# Patient Record
Sex: FEMALE | URBAN - METROPOLITAN AREA
[De-identification: names, ages, dates, MRNs, and addresses within clinical notes are randomized per-mention and may not be internally consistent; named-entity substitution may affect disease eponyms.]

---

## 2017-03-06 ENCOUNTER — TRANSFERRED RECORDS (OUTPATIENT)
Dept: HEALTH INFORMATION MANAGEMENT | Facility: CLINIC | Age: 47
End: 2017-03-06

## 2017-07-11 ENCOUNTER — TRANSFERRED RECORDS (OUTPATIENT)
Dept: HEALTH INFORMATION MANAGEMENT | Facility: CLINIC | Age: 47
End: 2017-07-11

## 2018-01-08 ENCOUNTER — TRANSFERRED RECORDS (OUTPATIENT)
Dept: HEALTH INFORMATION MANAGEMENT | Facility: CLINIC | Age: 48
End: 2018-01-08

## 2018-03-29 ENCOUNTER — TRANSFERRED RECORDS (OUTPATIENT)
Dept: HEALTH INFORMATION MANAGEMENT | Facility: CLINIC | Age: 48
End: 2018-03-29

## 2018-03-29 ENCOUNTER — MEDICAL CORRESPONDENCE (OUTPATIENT)
Dept: HEALTH INFORMATION MANAGEMENT | Facility: CLINIC | Age: 48
End: 2018-03-29

## 2018-05-01 ENCOUNTER — PRE VISIT (OUTPATIENT)
Dept: NEUROLOGY | Facility: CLINIC | Age: 48
End: 2018-05-01

## 2018-05-01 NOTE — TELEPHONE ENCOUNTER
FUTURE VISIT INFORMATION      FUTURE VISIT INFORMATION:    Date: 6/18/18    Time: 1:30p    [place] [facility] [place]  REFERRAL INFORMATION:    Referring provider: -Dr. Bogdan Arrington     Referring providers clinic:  [facility]    Reason for visit/diagnosis  Intractable chronic migraine without aura & without status migrainosus    RECORDS REQUESTED FROM:       Clinic name Comments Records Status Imaging Status   [facility] and referral Received                                    RECORDS STATUS        RECORDS RECEIVED FROM: [facility]   DATE RECEIVED: 4/27/18   NOTES (FOR ALL VISITS) STATUS DETAILS   OFFICE NOTE from referring provider Received 3/29/18, 10/12/17   OFFICE NOTE from other specialist N/A    DISCHARGE SUMMARY from hospital N/A    DISCHARGE REPORT from the ER N/A    OPERATIVE REPORT N/A    MEDICATION LIST Received    IMAGING  (FOR ALL VISITS)     EMG N/A    EEG N/A    ECT N/A    MRI (HEAD, NECK, SPINE) N/A    CT (HEAD, NECK, SPINE) N/A

## 2018-05-24 ENCOUNTER — TRANSFERRED RECORDS (OUTPATIENT)
Dept: HEALTH INFORMATION MANAGEMENT | Facility: CLINIC | Age: 48
End: 2018-05-24

## 2018-06-12 ASSESSMENT — ENCOUNTER SYMPTOMS
WHEEZING: 0
BACK PAIN: 1
NUMBNESS: 0
EYE WATERING: 0
HALLUCINATIONS: 0
DEPRESSION: 1
COUGH: 1
NECK PAIN: 1
SYNCOPE: 0
SNORES LOUDLY: 0
NAUSEA: 1
ALTERED TEMPERATURE REGULATION: 0
SLEEP DISTURBANCES DUE TO BREATHING: 0
NERVOUS/ANXIOUS: 0
SKIN CHANGES: 0
JOINT SWELLING: 0
DIZZINESS: 1
SPUTUM PRODUCTION: 0
SEIZURES: 0
POLYPHAGIA: 0
DYSURIA: 0
EYE PAIN: 1
VOMITING: 0
EXERCISE INTOLERANCE: 0
STIFFNESS: 1
TREMORS: 0
INSOMNIA: 1
COUGH DISTURBING SLEEP: 1
WEIGHT GAIN: 0
FATIGUE: 1
DOUBLE VISION: 1
DECREASED CONCENTRATION: 1
MUSCLE WEAKNESS: 0
DIFFICULTY URINATING: 0
PARALYSIS: 0
FLANK PAIN: 0
WEAKNESS: 0
HEADACHES: 1
HEMATURIA: 0
HYPOTENSION: 0
LIGHT-HEADEDNESS: 1
POOR WOUND HEALING: 0
POLYDIPSIA: 0
BLOATING: 0
BOWEL INCONTINENCE: 0
PANIC: 0
ORTHOPNEA: 0
CHILLS: 1
SORE THROAT: 0
INCREASED ENERGY: 0
LOSS OF CONSCIOUSNESS: 0
WEIGHT LOSS: 1
SHORTNESS OF BREATH: 0
ARTHRALGIAS: 1
HEARTBURN: 0
TROUBLE SWALLOWING: 0
FEVER: 0
SINUS CONGESTION: 0
NECK MASS: 0
POSTURAL DYSPNEA: 0
MEMORY LOSS: 1
EYE REDNESS: 0
DISTURBANCES IN COORDINATION: 0
SMELL DISTURBANCE: 0
EYE IRRITATION: 0
HOARSE VOICE: 0
SINUS PAIN: 0
TINGLING: 0
JAUNDICE: 0
DIARRHEA: 1
TASTE DISTURBANCE: 0
RECTAL PAIN: 0
CONSTIPATION: 1
HEMOPTYSIS: 0
NIGHT SWEATS: 1
DYSPNEA ON EXERTION: 0
MUSCLE CRAMPS: 0
ABDOMINAL PAIN: 1
DECREASED APPETITE: 1
HYPERTENSION: 0
LEG PAIN: 0
BLOOD IN STOOL: 0
NAIL CHANGES: 0
SPEECH CHANGE: 0
MYALGIAS: 1
PALPITATIONS: 0

## 2018-06-18 ENCOUNTER — HEALTH MAINTENANCE LETTER (OUTPATIENT)
Age: 48
End: 2018-06-18

## 2018-06-18 ENCOUNTER — OFFICE VISIT (OUTPATIENT)
Dept: NEUROLOGY | Facility: CLINIC | Age: 48
End: 2018-06-18
Payer: MEDICARE

## 2018-06-18 ENCOUNTER — MEDICAL CORRESPONDENCE (OUTPATIENT)
Dept: HEALTH INFORMATION MANAGEMENT | Facility: CLINIC | Age: 48
End: 2018-06-18

## 2018-06-18 VITALS
WEIGHT: 143.3 LBS | BODY MASS INDEX: 22.49 KG/M2 | OXYGEN SATURATION: 98 % | HEART RATE: 82 BPM | SYSTOLIC BLOOD PRESSURE: 122 MMHG | HEIGHT: 67 IN | TEMPERATURE: 96.7 F | RESPIRATION RATE: 15 BRPM | DIASTOLIC BLOOD PRESSURE: 40 MMHG

## 2018-06-18 DIAGNOSIS — G43.711 INTRACTABLE CHRONIC MIGRAINE WITHOUT AURA AND WITH STATUS MIGRAINOSUS: Primary | ICD-10-CM

## 2018-06-18 PROBLEM — F32.A DEPRESSION: Status: ACTIVE | Noted: 2018-06-18

## 2018-06-18 PROBLEM — Z87.898 PERSONAL HISTORY OF OTHER SPECIFIED DISEASES(V13.89): Status: ACTIVE | Noted: 2018-06-18

## 2018-06-18 PROBLEM — J45.909 ASTHMA: Status: ACTIVE | Noted: 2018-06-18

## 2018-06-18 PROBLEM — F32.A DEPRESSIVE DISORDER: Status: ACTIVE | Noted: 2018-06-18

## 2018-06-18 PROBLEM — M46.92: Status: ACTIVE | Noted: 2018-06-18

## 2018-06-18 RX ORDER — DIHYDROERGOTAMINE MESYLATE 1 MG/ML
1 INJECTION, SOLUTION INTRAMUSCULAR; INTRAVENOUS; SUBCUTANEOUS PRN
COMMUNITY
End: 2020-01-06

## 2018-06-18 RX ORDER — IBUPROFEN 200 MG
TABLET ORAL PRN
COMMUNITY

## 2018-06-18 RX ORDER — PEPPERMINT OIL
OIL (ML) MISCELLANEOUS PRN
COMMUNITY
End: 2018-06-18

## 2018-06-18 RX ORDER — TRIAMCINOLONE ACETONIDE 55 UG/1
2 SPRAY, METERED NASAL DAILY
COMMUNITY

## 2018-06-18 RX ORDER — SALICYLIC ACID
POWDER (GRAM) MISCELLANEOUS PRN
COMMUNITY
End: 2018-06-18

## 2018-06-18 RX ORDER — BACLOFEN 10 MG/1
15 TABLET ORAL 3 TIMES DAILY
COMMUNITY
Start: 2018-04-19

## 2018-06-18 RX ORDER — BUPROPION HYDROCHLORIDE 300 MG/1
300 TABLET ORAL DAILY
COMMUNITY
Start: 2018-04-19

## 2018-06-18 RX ORDER — ACETAMINOPHEN 500 MG
500 TABLET ORAL PRN
COMMUNITY

## 2018-06-18 RX ORDER — MECLIZINE HYDROCHLORIDE 25 MG/1
25 TABLET ORAL PRN
COMMUNITY
Start: 2017-10-12

## 2018-06-18 RX ORDER — KETOROLAC TROMETHAMINE 30 MG/ML
INJECTION, SOLUTION INTRAMUSCULAR; INTRAVENOUS PRN
COMMUNITY
Start: 2017-10-12

## 2018-06-18 RX ORDER — ONDANSETRON 8 MG/1
TABLET, FILM COATED ORAL PRN
COMMUNITY

## 2018-06-18 RX ORDER — MONTELUKAST SODIUM 10 MG/1
10 TABLET ORAL DAILY
COMMUNITY
Start: 2017-10-12

## 2018-06-18 RX ORDER — ALBUTEROL SULFATE 90 UG/1
2 AEROSOL, METERED RESPIRATORY (INHALATION) PRN
COMMUNITY
Start: 2017-10-12

## 2018-06-18 RX ORDER — ATENOLOL 25 MG/1
25 TABLET ORAL DAILY
COMMUNITY
Start: 2018-04-19 | End: 2020-01-06

## 2018-06-18 RX ORDER — ONDANSETRON 4 MG/1
TABLET, FILM COATED ORAL PRN
COMMUNITY
Start: 2017-10-12 | End: 2020-01-06

## 2018-06-18 RX ORDER — ATENOLOL 25 MG/1
25 TABLET ORAL DAILY
COMMUNITY
End: 2018-06-18

## 2018-06-18 ASSESSMENT — PAIN SCALES - GENERAL: PAINLEVEL: MODERATE PAIN (5)

## 2018-06-18 NOTE — NURSING NOTE
Chief Complaint   Patient presents with     Consult     [facility]- NEW CHRONIC MIGRAINES     Cachorro Gamez, EMT

## 2018-06-18 NOTE — LETTER
6/18/2018       RE: Yady Aquino  [address]  [facility] [address]     Dear Colleague,    Thank you for referring your patient, Yady Aquino, to the [facility] NEUROLOGY at [facility]. Please see a copy of my visit note below.        [facility]    Yady Aquino MRN# [id number]   Age: 47 year old YOB: 1970     Requesting physician: Bogdan Gary     Chief Complaint:    Referred by Dr. Rainey for evaluation of chronic migraine headaches    History of Present Illness:  Yady  is a 47-year-old woman who is from [place].  She is accompanied to her appointment by her .  The patient reports she has had headaches constantly since December 2014. She reports she had had headaches since childhood. She had them 2-3 times a week and then it changed to an intractable headache.    She also has chronic neck pain.  She is on disability. Has been receiving care in [place].    She has variable locations of the pain.   Mostly throbbing or pulsatile  4-5/10 on average, flares to 8/10  No clear triggers for flares but later she brings out a list of triggers including stress, riding in a car, pressure on head from pillow, reading for more than 5-10. She feels worse with hair tied back, physical activity, neck and back pain.    She has light and sound sensitivity intermittent  She has nausea, no vomiting    Tried the following preventive treatments:  Verapamil 180 mg caused constipation, no help  Botox 4 treatments Q 12 weeks  Topamax 25 mg BID blurred vision, stomach upset  Keppra 250 mg 1-2 tabs BID  Effexor dose ?  Lamictal dose ? Rash  Gabapentin dose ?  Hydroxyzine 50 mg Q AM, 50 mg noon and 100 mg Q HS  Nortriptyline 25 mg didn't work, dry mouth, more depressed       Abortives tried and failed:  Ibuprofen  Tylenol  Maxalt  Migranal  Zomig  Axert  Midrin  Phenergan      Currently she is taking is bupropion for mood. She is  baclofen for neck pain.   She uses ibuprofen or tylenol 1-2 times a week  DHE no more than twice a week.    She has tried numerous DHE infusions which seems to have been the best thing for her.  She gets 2 days headache freedom and then the symptoms persist.  The patient denies any symptoms of depression.    She has gone physical therapy.   No acupuncture, has tried dry needling  Chiropractor.    Multiple MRI brain in past. Disc from [place]    She has hemochromatosis. 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 No pertinent surgical history. No surgeries except radiofrequency ablation    Social History     Social History     Marital status: Single     Spouse name: N/A     Number of children: N/A     Years of education: N/A     Occupational History     Not on file.     Social History Main Topics     Smoking status: Never Smoker     Smokeless tobacco: Never Used     Alcohol use Yes      Comment: LESS THAN ONE PER WEEK     Drug use: No     Sexual activity: Yes     Partners: Male     Birth control/ protection: Male Surgical     Other Topics Concern     Not on file     Social History Narrative     No narrative on file   Caffeine: one can/once a week  Exercising: no    Family History   Problem Relation Age of Onset      Hemochromatosis Sister      Migraines Sister      3 sisters     Hemochromatosis Brother          Current Outpatient Prescriptions   Medication Sig     acetaminophen (TYLENOL) 500 MG tablet Take 500 mg by mouth as needed     albuterol (PROAIR HFA/PROVENTIL HFA/VENTOLIN HFA) 108 (90 Base) MCG/ACT Inhaler Inhale 2 puffs into the lungs as needed     atenolol (TENORMIN) 25 MG tablet Take 25 mg by mouth daily     atenolol (TENORMIN) 25 MG tablet Take 25 mg by mouth daily     baclofen (LIORESAL) 10 MG tablet Take 15 mg by mouth 3 times daily     buPROPion (WELLBUTRIN XL) 300 MG 24 hr tablet Take 300 mg by mouth daily     Calcium Carb-Cholecalciferol (CALCIUM + D3) 600-800 MG-UNIT TABS Take 600-800 mg by mouth daily     dihydroergotamine (DHE) 1 MG/ML injection Inject 1 mg into the muscle as needed     DiphenhydrAMINE HCl, Sleep, 25 MG CAPS Take 25 mg by mouth as needed     FOLIC ACID PO Take 800 mcg by mouth daily     ibuprofen (ADVIL/MOTRIN) 200 MG tablet as needed     ketorolac (TORADOL) 60 MG/2ML SOLN injection as needed     Ketorolac Tromethamine (TORADOL IM)      Lavender Oil OIL as needed     MAGNESIUM CITRATE  mg daily     meclizine (ANTIVERT) 25 MG tablet 25 mg as needed     montelukast (SINGULAIR) 10 MG tablet 10 mg daily     Multiple Vitamins-Minerals (MULTIVITAL) TABS 1 tablet daily     ondansetron (ZOFRAN) 4 MG tablet as needed     ondansetron (ZOFRAN) 8 MG tablet as needed     peppermint oil oil as needed for other     Probiotic Product (SOLUBLE FIBER/PROBIOTICS PO)      ranitidine (ZANTAC) 150 MG tablet 150 mg as needed     triamcinolone (NASACORT ALLERGY 24HR) 55 MCG/ACT Inhaler daily     No current facility-administered medications for this visit.           Allergies   Allergen Reactions     Lamictal [Lamotrigine] Shortness Of Breath     rash     Metronidazole GI Disturbance     Metronidazole GI Disturbance     Seasonal Other (See Comments)       ROS: Please see HPI all other systems review and  "negative. Sleep 7-8 hours a night. Usually interrupted. She awakens with the headache pain.     Physical Examination:  /40  Pulse 82  Temp 96.7  F (35.9  C) (Oral)  Resp 15  Ht 1.707 m (5' 7.2\")  Wt 65 kg (143 lb 4.8 oz)  SpO2 98%  Breastfeeding? No  BMI 22.31 kg/m2  General Appearance:  The patient is well-groomed and cooperative with examination.  She is in no acute distress.  Somewhat of a flat affect  Neurological Examination  Cognition: oriented x3, attention and recall intact. No aphasia or dysarthria  Cranial Nerves: 2-12 intact.  Patient has normal muscle bulk and tone in all 4 extremities.  Gait examination is stable    Cardiovascular Examination:  Heart is regular in rate and rhythm to auscultation. No significant murmurs. No carotid bruits. No significant peripheral edema. Pedal pulses are palpable bilaterally.     Musculoskeletal Examination:  Neck is supple with full range of motion. No tenderness to palpation.      Investigations:  MRI scan of the brain reviewed from March 2015 [facility] at [facility].  Images are entirely normal    Impression/Recommendations:  1.  Chronic migraine headache, intractable  The patient reports constant headache since December 2014.  She has lifelong problems with migraine headaches.  She has failed numerous medications as listed in HPI.  I reviewed with her that there are very few options left for her.  Most likely her headache management is about managing and living with pain rather than becoming pain-free.    The patient needs to understand the medication overuse can occur if she uses too many different short acting pain medicines.  Currently I believe she is using ibuprofen 2 days a week, Toradol 2 days a week and DHE 2 days a week.  She needs to limit use to 2 days a week and pick the most effective treatment.  She needs to start exercising again and working on healthy lifestyle choices for people with migraine headaches.    I explained to the " patient that the key to treating intractable headaches is not to focus on the abortive treatments but rather on the preventive treatments.  She could consider trying the new medication, Aimovig, at the higher dose of 140 mg once a month.  She is concerned about the cost but overall the treatment cost is less than Botox.    The patient was also educated regarding the different medical devices around to treat migraine headaches including the Cefaly device and eNeura.  We discussed how they are used, effectiveness as well as cost.    The patient would like to try Aimovig first I had her fill out an application form and counseled her with the demo pen exactly how the injections are done.  Because she comes from so far away she will use [employer] support services through to help with the injection treatment rather than coming back here. Aimovig Ally we will set up a telephone call visit in 8 weeks time to see how her headaches have improved.    Today I met with the patient for 70 minutes over 50% of this was counseling regarding chronic migraine headache management.    Kourtney Willis MD [facility]  Department of Neurology

## 2018-06-18 NOTE — PATIENT INSTRUCTIONS
Keep headache journal of your symptoms. Try to track how often you have a headache, how long it lasts and what medicines you used. You can also track severity of headache    You can use smart phone apps: my migraine luis felipe or use paper.       Try not to use triptan medicines more than 2 days a week    Lifestyle changes you can make to help your headaches:  1. Try to drink enough water each day (48-70 fluid ounces a day)  2. Limit caffeine intact-one caffeinated beverage a day  3. Eat regular meals  4. Try to get cardiovascular exercise (walking, swimming, biking) 4-5 times a week  5. Try to have a routine sleep schedule going to bed at the same time each night and getting up the same time each morning with enough time to sleep in between  6. Sometimes foods can trigger migraines you can try to eliminate them if you think they make symptoms worse: dairy, gluten, nuts (cashews, almonds), artificial sweeteners, artificial colors, high sugar content foods, certain alcohol, chocolate.    To learn more about headaches you can go to the following websites:  https://americanmigrainefoundation.org/  http://www.headaches.org/

## 2018-06-18 NOTE — PROGRESS NOTES
[facility]    Yady Aquino MRN# [id number]   Age: 47 year old YOB: 1970     Requesting physician: Bogdan Gary     Chief Complaint:    Referred by Dr. Rainey for evaluation of chronic migraine headaches    History of Present Illness:  Yady  is a 47-year-old woman who is from [place].  She is accompanied to her appointment by her .  The patient reports she has had headaches constantly since December 2014. She reports she had had headaches since childhood. She had them 2-3 times a week and then it changed to an intractable headache.    She also has chronic neck pain.  She is on disability. Has been receiving care in [place].    She has variable locations of the pain.   Mostly throbbing or pulsatile  4-5/10 on average, flares to 8/10  No clear triggers for flares but later she brings out a list of triggers including stress, riding in a car, pressure on head from pillow, reading for more than 5-10. She feels worse with hair tied back, physical activity, neck and back pain.    She has light and sound sensitivity intermittent  She has nausea, no vomiting    Tried the following preventive treatments:  Verapamil 180 mg caused constipation, no help  Botox 4 treatments Q 12 weeks  Topamax 25 mg BID blurred vision, stomach upset  Keppra 250 mg 1-2 tabs BID  Effexor dose ?  Lamictal dose ? Rash  Gabapentin dose ?  Hydroxyzine 50 mg Q AM, 50 mg noon and 100 mg Q HS  Nortriptyline 25 mg didn't work, dry mouth, more depressed       Abortives tried and failed:  Ibuprofen  Tylenol  Maxalt  Migranal  Zomig  Axert  Midrin  Phenergan      Currently she is taking is bupropion for mood. She is baclofen for neck pain.   She uses ibuprofen or tylenol 1-2 times a week  DHE no more than twice a week.    She has tried numerous DHE infusions which seems to have been the best thing for her.  She gets 2 days headache freedom and then the symptoms persist.  The patient denies any  symptoms of depression.    She has gone physical therapy.   No acupuncture, has tried dry needling  Chiropractor.    Multiple MRI brain in past. Disc from [place]    She has hemochromatosis. 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 No pertinent surgical history. No surgeries except radiofrequency ablation    Social History     Social History     Marital status: Single     Spouse name: N/A     Number of children: N/A     Years of education: N/A     Occupational History     Not on file.     Social History Main Topics     Smoking status: Never Smoker     Smokeless tobacco: Never Used     Alcohol use Yes      Comment: LESS THAN ONE PER WEEK     Drug use: No     Sexual activity: Yes     Partners: Male     Birth control/ protection: Male Surgical     Other Topics Concern     Not on file     Social History Narrative     No narrative on file   Caffeine: one can/once a week  Exercising: no    Family History   Problem Relation Age of Onset     Hemochromatosis Sister      Migraines Sister      3 sisters     Hemochromatosis Brother          Current Outpatient Prescriptions   Medication Sig     acetaminophen (TYLENOL) 500 MG tablet Take 500 mg by mouth as needed     albuterol (PROAIR HFA/PROVENTIL HFA/VENTOLIN HFA) 108 (90 Base)  "MCG/ACT Inhaler Inhale 2 puffs into the lungs as needed     atenolol (TENORMIN) 25 MG tablet Take 25 mg by mouth daily     atenolol (TENORMIN) 25 MG tablet Take 25 mg by mouth daily     baclofen (LIORESAL) 10 MG tablet Take 15 mg by mouth 3 times daily     buPROPion (WELLBUTRIN XL) 300 MG 24 hr tablet Take 300 mg by mouth daily     Calcium Carb-Cholecalciferol (CALCIUM + D3) 600-800 MG-UNIT TABS Take 600-800 mg by mouth daily     dihydroergotamine (DHE) 1 MG/ML injection Inject 1 mg into the muscle as needed     DiphenhydrAMINE HCl, Sleep, 25 MG CAPS Take 25 mg by mouth as needed     FOLIC ACID PO Take 800 mcg by mouth daily     ibuprofen (ADVIL/MOTRIN) 200 MG tablet as needed     ketorolac (TORADOL) 60 MG/2ML SOLN injection as needed     Ketorolac Tromethamine (TORADOL IM)      Lavender Oil OIL as needed     MAGNESIUM CITRATE  mg daily     meclizine (ANTIVERT) 25 MG tablet 25 mg as needed     montelukast (SINGULAIR) 10 MG tablet 10 mg daily     Multiple Vitamins-Minerals (MULTIVITAL) TABS 1 tablet daily     ondansetron (ZOFRAN) 4 MG tablet as needed     ondansetron (ZOFRAN) 8 MG tablet as needed     peppermint oil oil as needed for other     Probiotic Product (SOLUBLE FIBER/PROBIOTICS PO)      ranitidine (ZANTAC) 150 MG tablet 150 mg as needed     triamcinolone (NASACORT ALLERGY 24HR) 55 MCG/ACT Inhaler daily     No current facility-administered medications for this visit.           Allergies   Allergen Reactions     Lamictal [Lamotrigine] Shortness Of Breath     rash     Metronidazole GI Disturbance     Metronidazole GI Disturbance     Seasonal Other (See Comments)       ROS: Please see HPI all other systems review and negative. Sleep 7-8 hours a night. Usually interrupted. She awakens with the headache pain.     Physical Examination:  /40  Pulse 82  Temp 96.7  F (35.9  C) (Oral)  Resp 15  Ht 1.707 m (5' 7.2\")  Wt 65 kg (143 lb 4.8 oz)  SpO2 98%  Breastfeeding? No  BMI 22.31 kg/m2  General " Appearance:  The patient is well-groomed and cooperative with examination.  She is in no acute distress.  Somewhat of a flat affect  Neurological Examination  Cognition: oriented x3, attention and recall intact. No aphasia or dysarthria  Cranial Nerves: 2-12 intact.  Patient has normal muscle bulk and tone in all 4 extremities.  Gait examination is stable    Cardiovascular Examination:  Heart is regular in rate and rhythm to auscultation. No significant murmurs. No carotid bruits. No significant peripheral edema. Pedal pulses are palpable bilaterally.     Musculoskeletal Examination:  Neck is supple with full range of motion. No tenderness to palpation.      Investigations:  MRI scan of the brain reviewed from March 2015 performed at [facility].  Images are entirely normal    Impression/Recommendations:  1.  Chronic migraine headache, intractable  The patient reports constant headache since December 2014.  She has lifelong problems with migraine headaches.  She has failed numerous medications as listed in HPI.  I reviewed with her that there are very few options left for her.  Most likely her headache management is about managing and living with pain rather than becoming pain-free.    The patient needs to understand the medication overuse can occur if she uses too many different short acting pain medicines.  Currently I believe she is using ibuprofen 2 days a week, Toradol 2 days a week and DHE 2 days a week.  She needs to limit use to 2 days a week and pick the most effective treatment.  She needs to start exercising again and working on healthy lifestyle choices for people with migraine headaches.    I explained to the patient that the key to treating intractable headaches is not to focus on the abortive treatments but rather on the preventive treatments.  She could consider trying the new medication, Aimovig, at the higher dose of 140 mg once a month.  She is concerned about the cost but overall the treatment  cost is less than Botox.    The patient was also educated regarding the different medical devices around to treat migraine headaches including the Cefaly device and eNeura.  We discussed how they are used, effectiveness as well as cost.    The patient would like to try Aimovig first I had her fill out an application form and counseled her with the demo pen exactly how the injections are done.  Because she comes from so far away she will use [employer] support services through to help with the injection treatment rather than coming back here. Aimovig Ally we will set up a telephone call visit in 8 weeks time to see how her headaches have improved.    Today I met with the patient for 70 minutes over 50% of this was counseling regarding chronic migraine headache management.    Kourtney Willis MD [facility]  Department of Neurology      Answers for HPI/ROS submitted by the patient on 6/12/2018   General Symptoms: Yes  Skin Symptoms: Yes  HENT Symptoms: Yes  EYE SYMPTOMS: Yes  HEART SYMPTOMS: Yes  LUNG SYMPTOMS: Yes  INTESTINAL SYMPTOMS: Yes  URINARY SYMPTOMS: Yes  GYNECOLOGIC SYMPTOMS: No  BREAST SYMPTOMS: No  SKELETAL SYMPTOMS: Yes  BLOOD SYMPTOMS: No  NERVOUS SYSTEM SYMPTOMS: Yes  MENTAL HEALTH SYMPTOMS: Yes  Fever: No  Loss of appetite: Yes  Weight loss: Yes  Weight gain: No  Fatigue: Yes  Night sweats: Yes  Chills: Yes  Increased stress: Yes  Excessive hunger: No  Excessive thirst: No  Feeling hot or cold when others believe the temperature is normal: No  Loss of height: No  Post-operative complications: No  Surgical site pain: No  Hallucinations: No  Change in or Loss of Energy: No  Hyperactivity: No  Confusion: No  Changes in hair: Yes  Changes in moles/birth marks: No  Itching: Yes  Rashes: Yes  Changes in nails: No  Acne: No  Hair in places you don't want it: No  Change in facial hair: No  Warts: No  Non-healing sores: No  Scarring: No  Flaking of skin: No  Color changes of hands/feet in cold : No  Sun sensitivity:  No  Skin thickening: No  Ear pain: Yes  Ear discharge: No  Hearing loss: No  Tinnitus: Yes  Nosebleeds: No  Congestion: No  Sinus pain: No  Trouble swallowing: No   Voice hoarseness: No  Mouth sores: No  Sore throat: No  Tooth pain: No  Gum tenderness: No  Bleeding gums: No  Change in taste: No  Change in sense of smell: No  Dry mouth: No  Hearing aid used: No  Neck lump: No  Eye pain: Yes  Vision loss: Yes  Dry eyes: No  Watery eyes: No  Eye bulging: No  Double vision: Yes  Flashing of lights: No  Spots: No  Floaters: No  Redness: No  Crossed eyes: No  Tunnel Vision: No  Yellowing of eyes: No  Eye irritation: No  Cough: Yes  Sputum or phlegm: No  Coughing up blood: No  Difficulty breating or shortness of breath: No  Snoring: No  Wheezing: No  Difficulty breathing on exertion: No  Nighttime Cough: Yes  Difficulty breathing when lying flat: No  Chest pain or pressure: No  Fast or irregular heartbeat: No  Pain in legs with walking: No  Trouble breathing while lying down: No  Fingers or toes appear blue: No  High blood pressure: No  Low blood pressure: No  Fainting: No  Murmurs: No  Pacemaker: No  Varicose veins: No  Edema or swelling: No  Wake up at night with shortness of breath: No  Light-headedness: Yes  Exercise intolerance: No  Heart burn or indigestion: No  Nausea: Yes  Vomiting: No  Abdominal pain: Yes  Bloating: No  Constipation: Yes  Diarrhea: Yes  Blood in stool: No  Black stools: No  Rectal or Anal pain: No  Fecal incontinence: No  Yellowing of skin or eyes: No  Vomit with blood: No  Change in stools: No  Trouble holding urine or incontinence: Yes  Pain or burning: No  Trouble starting or stopping: No  Increased frequency of urination: No  Blood in urine: No  Decreased frequency of urination: No  Frequent nighttime urination: No  Flank pain: No  Difficulty emptying bladder: No  Back pain: Yes  Muscle aches: Yes  Neck pain: Yes  Swollen joints: No  Joint pain: Yes  Muscle cramps: No  Muscle weakness:  No  Joint stiffness: Yes  Bone fracture: No  Trouble with coordination: No  Dizziness or trouble with balance: Yes  Fainting or black-out spells: No  Memory loss: Yes  Headache: Yes  Seizures: No  Speech problems: No  Tingling: No  Tremor: No  Weakness: No  Difficulty walking: No  Paralysis: No  Numbness: No  Nervous or Anxious: No  Depression: Yes  Trouble sleeping: Yes  Trouble thinking or concentrating: Yes  Mood changes: No  Panic attacks: No

## 2018-08-13 ENCOUNTER — TELEPHONE (OUTPATIENT)
Dept: NEUROLOGY | Facility: CLINIC | Age: 48
End: 2018-08-13

## 2018-08-13 NOTE — TELEPHONE ENCOUNTER
[facility] Prior Authorization Team Request    Medication: AIMOVIG 140MG (2 X 70 AUTO-INJECTIORS)  Dosin SYRINGES ONCE EVERY 30 DAYS  Qty: 2  Day Supply: 30  NDC (required for Medicaid members): [address]-90     Insurance   BIN: [id number]  PCN: [id number]  Grp: [id number]  ID: [id number]    CoverMyMeds Key (if applicable):     Additional documentation:       Filling Pharmacy: [facility]  Phone Number: [phone]  Contact:    Pharmacy NPI (required for Medicaid members): [id number]

## 2018-08-13 NOTE — TELEPHONE ENCOUNTER
[facility] Prior Authorization Team   Phone: [phone]  Fax: [phone]  PA Initiation    Medication: AIMOVIG 140MG (2 X 70 AUTO-INJECTIORS)-PA PENDING  Insurance Company: [employer] - Phone [phone] Fax [phone]  Pharmacy Filling the Rx: [facility] [place], [place] - [address] [place] [address] [phone]  Filling Pharmacy Phone: [phone]  Filling Pharmacy Fax: [phone]  Start Date: 8/13/2018

## 2018-08-14 NOTE — TELEPHONE ENCOUNTER
[facility] Prior Authorization Team   Phone: [phone]  Fax: [phone]  Prior Authorization Approval    Authorization Effective Date: 12/30/2017  Authorization Expiration Date: 12/31/2018  Medication: AIMOVIG 140MG (2 X 70 AUTO-INJECTIORS)-PA APPROVED  Approved Dose/Quantity: 2/30DS  Reference #:    Insurance Company: [employer] - Phone [phone] Fax [phone]  Expected CoPay:       CoPay Card Available:      Foundation Assistance Needed:    Which Pharmacy is filling the prescription (Not needed for infusion/clinic administered): [facility] [place], [place] - [address] [place] [address] [phone]  Pharmacy Notified: Yes  Patient Notified: Yes

## 2018-10-30 DIAGNOSIS — G43.711 INTRACTABLE CHRONIC MIGRAINE WITHOUT AURA AND WITH STATUS MIGRAINOSUS: ICD-10-CM

## 2018-12-18 ENCOUNTER — MYC REFILL (OUTPATIENT)
Dept: NEUROLOGY | Facility: CLINIC | Age: 48
End: 2018-12-18

## 2018-12-18 DIAGNOSIS — G43.711 INTRACTABLE CHRONIC MIGRAINE WITHOUT AURA AND WITH STATUS MIGRAINOSUS: ICD-10-CM

## 2019-02-20 ASSESSMENT — ENCOUNTER SYMPTOMS
TROUBLE SWALLOWING: 0
NAUSEA: 1
DEPRESSION: 1
DECREASED APPETITE: 1
EYE WATERING: 0
POLYPHAGIA: 0
BLOOD IN STOOL: 0
TINGLING: 1
PANIC: 0
HOARSE VOICE: 0
HALLUCINATIONS: 0
ARTHRALGIAS: 1
NECK PAIN: 1
CHILLS: 0
MYALGIAS: 1
PARALYSIS: 0
TREMORS: 0
DIZZINESS: 1
STIFFNESS: 1
RECTAL PAIN: 0
MUSCLE WEAKNESS: 0
BOWEL INCONTINENCE: 0
INCREASED ENERGY: 0
EYE IRRITATION: 0
NUMBNESS: 1
DIARRHEA: 0
FEVER: 0
POLYDIPSIA: 0
EYE REDNESS: 0
WEIGHT GAIN: 0
CONSTIPATION: 0
SORE THROAT: 0
FATIGUE: 1
BLOATING: 0
ABDOMINAL PAIN: 0
INSOMNIA: 0
NIGHT SWEATS: 1
DECREASED CONCENTRATION: 1
WEIGHT LOSS: 1
MEMORY LOSS: 0
NECK MASS: 0
MUSCLE CRAMPS: 0
HEARTBURN: 0
DISTURBANCES IN COORDINATION: 0
SINUS CONGESTION: 1
HEADACHES: 1
WEAKNESS: 0
ALTERED TEMPERATURE REGULATION: 0
EYE PAIN: 1
LOSS OF CONSCIOUSNESS: 0
JOINT SWELLING: 0
VOMITING: 0
SEIZURES: 0
BACK PAIN: 1
NERVOUS/ANXIOUS: 1
SINUS PAIN: 0
SMELL DISTURBANCE: 0
DOUBLE VISION: 0
SPEECH CHANGE: 0
JAUNDICE: 0
TASTE DISTURBANCE: 0

## 2019-03-06 ENCOUNTER — OFFICE VISIT (OUTPATIENT)
Dept: NEUROLOGY | Facility: CLINIC | Age: 49
End: 2019-03-06
Payer: MEDICARE

## 2019-03-06 VITALS
DIASTOLIC BLOOD PRESSURE: 77 MMHG | RESPIRATION RATE: 16 BRPM | HEIGHT: 67 IN | OXYGEN SATURATION: 99 % | BODY MASS INDEX: 21.02 KG/M2 | WEIGHT: 133.9 LBS | HEART RATE: 88 BPM | SYSTOLIC BLOOD PRESSURE: 121 MMHG | TEMPERATURE: 98.6 F

## 2019-03-06 DIAGNOSIS — G43.719 INTRACTABLE CHRONIC MIGRAINE WITHOUT AURA AND WITHOUT STATUS MIGRAINOSUS: Primary | ICD-10-CM

## 2019-03-06 RX ORDER — TENS UNITS AND TENS ELECTRODES
1 COMBINATION PACKAGE (EA) MISCELLANEOUS DAILY
Qty: 1 DEVICE | Refills: 1 | Status: SHIPPED | OUTPATIENT
Start: 2019-03-06

## 2019-03-06 ASSESSMENT — MIFFLIN-ST. JEOR: SCORE: 1278.25

## 2019-03-06 ASSESSMENT — PAIN SCALES - GENERAL: PAINLEVEL: SEVERE PAIN (7)

## 2019-03-06 NOTE — LETTER
3/6/2019       RE: Yady Aquino  [address]  [facility] [address]     Dear Colleague,    Thank you for referring your patient, Yady Aquino, to the [facility] NEUROLOGY at [facility]. Please see a copy of my visit note below.        [facility]    Yady Aquino MRN# [id number]   Age: 48 year old YOB: 1970     Requesting physician: Bogdan Gary     Chief Complaint:  Return visit for migraines    History of Present Illness:  Yady Aquino is a 48-year-old woman with chronic intractable migraine headaches.  The patient comes in for her second appointment here having not been seen in person since June 2018.  She lives in [place] and has come a long way to be seen here.  The patient has had migraine headaches since childhood but in December 2014 they became constant with an intractable nature.  In addition to headache she has neck pain that is constantly present as well.    The patient has mainly a throbbing or pulsatile type pain that can average 4-5 out of 10 on the pain scale flares up to 8 out of 10.  Riding in the car is a particularly uncomfortable trigger for her so she reports that she feels worse today.  She does have light and sound sensitivity with her headache she also has nausea but no vomiting.    Previous treatment preventive trials:  Verapamil 180 mg caused constipation, no help  Botox 4 treatments Q 12 weeks  Topamax 25 mg BID blurred vision, stomach upset  Keppra 250 mg 1-2 tabs BID  Effexor dose ?  Lamictal dose ? Rash  Gabapentin dose ?  Hydroxyzine 50 mg Q AM, 50 mg noon and 100 mg Q HS  Nortriptyline 25 mg didn't work, dry mouth, more depressed  Aimovig 140 mg monthly subcutaneous injections for the past 6 months    The patient returns reporting Aimovig has made no difference in her headaches at all.  She is wondering what else can be tried.  She does not endorse any medication overuse headaches at this  point in time.    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)     Hereditary hemochromatosis (H)     Intractable chronic migraine without aura     Myalgia and myositis     Neck pain     Personal history of other specified diseases(V13.89)     Screening for other and unspecified endocrine, nutritional, metabolic, and immunity disorders       Past Surgical History:   Procedure Laterality Date     XR CERVICAL RADIOFREQ ABLATION BILATERAL Bilateral        Social History     Socioeconomic History     Marital status: Single     Spouse name: Not on file     Number of children: Not on file     Years of education: Not on file     Highest education level: Not on file   Occupational History     Not on file   Social Needs     Financial resource strain: Not on file     Food insecurity:     Worry: Not on file     Inability: Not on file     Transportation needs:     Medical: Not on file     Non-medical: Not on file   Tobacco Use     Smoking status: Never Smoker     Smokeless tobacco: Never Used   Substance and Sexual Activity     Alcohol use: Yes     Comment: LESS THAN ONE PER WEEK     Drug use: No     Sexual activity: Yes     Partners: Male     Birth control/protection: Male Surgical   Lifestyle     Physical activity:     Days per week: Not on file     Minutes per session: Not on file     Stress: Not on file   Relationships     Social connections:     Talks on phone: Not on file     Gets together: Not on file     Attends Cheondoism service: Not on file     Active member of club or organization: Not on file     Attends meetings of clubs or organizations: Not on file      Relationship status: Not on file     Intimate partner violence:     Fear of current or ex partner: Not on file     Emotionally abused: Not on file     Physically abused: Not on file     Forced sexual activity: Not on file   Other Topics Concern     Parent/sibling w/ CABG, MI or angioplasty before 65F 55M? Not Asked   Social History Narrative     Not on file       Family History   Problem Relation Age of Onset     Hemochromatosis Sister      Migraines Sister         3 sisters     Hemochromatosis Brother        Current Outpatient Medications   Medication Sig     acetaminophen (TYLENOL) 500 MG tablet Take 500 mg by mouth as needed     albuterol (PROAIR HFA/PROVENTIL HFA/VENTOLIN HFA) 108 (90 Base) MCG/ACT Inhaler Inhale 2 puffs into the lungs as needed     baclofen (LIORESAL) 10 MG tablet Take 15 mg by mouth 3 times daily     buPROPion (WELLBUTRIN XL) 300 MG 24 hr tablet Take 300 mg by mouth daily     Calcium Carb-Cholecalciferol (CALCIUM + D3) 600-800 MG-UNIT TABS Take 600-800 mg by mouth daily     DiphenhydrAMINE HCl, Sleep, 25 MG CAPS Take 25 mg by mouth as needed     FOLIC ACID PO Take 800 mcg by mouth daily     fremanezumab-vfrm (AJOVY) SOSY subcutaneous Inject 1.5 mLs (225 mg) Subcutaneous every 30 days     ibuprofen (ADVIL/MOTRIN) 200 MG tablet as needed     ketorolac (TORADOL) 60 MG/2ML SOLN injection as needed     MAGNESIUM CITRATE  mg daily     meclizine (ANTIVERT) 25 MG tablet 25 mg as needed     montelukast (SINGULAIR) 10 MG tablet 10 mg daily     Multiple Vitamins-Minerals (MULTIVITAL) TABS 1 tablet daily     Nerve Stimulator (CEFALY KIT) NEMO 1 Device daily Dual device     ondansetron (ZOFRAN) 8 MG tablet as needed     Probiotic Product (SOLUBLE FIBER/PROBIOTICS PO)      ranitidine (ZANTAC) 150 MG tablet 150 mg as needed     triamcinolone (NASACORT ALLERGY 24HR) 55 MCG/ACT Inhaler daily     atenolol (TENORMIN) 25 MG tablet Take 25 mg by mouth daily     dihydroergotamine (DHE) 1 MG/ML injection  "Inject 1 mg into the muscle as needed     Ketorolac Tromethamine (TORADOL IM)      ondansetron (ZOFRAN) 4 MG tablet as needed     No current facility-administered medications for this visit.           Allergies   Allergen Reactions     Lamictal [Lamotrigine] Rash     rash     Metronidazole GI Disturbance     Metronidazole GI Disturbance     Seasonal Other (See Comments)       ROS: Please see HPI all other systems review and negative. See patient questionnaire below    Physical Examination:  /77 (BP Location: Left arm, Patient Position: Sitting, Cuff Size: Adult Regular)   Pulse 88   Temp 98.6  F (37  C) (Oral)   Resp 16   Ht 1.707 m (5' 7.21\")   Wt 60.7 kg (133 lb 14.4 oz)   SpO2 99%   BMI 20.84 kg/m     General Appearance:  The patient is well-groomed and cooperative with examination.  She is in no acute distress.  She does appear to have somewhat of a flat affect.  Neurological Examination  Cognition: oriented x3, attention and recall intact. No aphasia or dysarthria  Cranial Nerves: 2-12 intact. Funduscopic examination is normal with sharp disc margins bilaterally.     Investigations:    MRI brain with and without contrast April 20, 2015 performed at [facility]  Findings consistent with a few small foci of increased T2 signal within the cerebral white matter in a nonspecific matter otherwise normal.    MRI cervical spine without contrast performed August 20, 2015  Mild degenerative changes at C5-6, C6-7.  Posterior disc osteophyte complex seen at C5-6.  This results in mild canal narrowing and may be abutting the right ventral surface of the spinal cord at this level without significant displacement.  Mild canal narrowing at C6-7 with uncovertebral joint spurring.  Mild neuroforaminal narrowing with no obvious root impingement identified at other levels.    Impression/Recommendations:  1.  Chronic migraine headaches  The patient endorses chronic intractable migraine headaches that do not go away " at any point time.  She is failed numerous therapies.  I really am running out of options at this point in time.  I think Ajovy would be the next 1 to try.  She should try this for 3 months at 225 mg once monthly injections.  If no change we will then try Emgality. She will also try Cefaly device.    The patient is noted to report a lot of pain but does appear to have somewhat of a flat affect today.  I do question whether there is some functional overlay related to somatic disorder and psychiatric depression.  She might be a good candidate for the conference of headache program that is about to open up and second quarter of this year here where she could also see PM&R and health psychology at the same time for her visits.  I will consider enrolling her into the clinic when available.    Kourtney Willis MD [facility]  Department of Neurology  Pager [phone]

## 2019-03-07 ENCOUNTER — TELEPHONE (OUTPATIENT)
Dept: NEUROLOGY | Facility: CLINIC | Age: 49
End: 2019-03-07

## 2019-03-07 NOTE — TELEPHONE ENCOUNTER
[facility] Prior Authorization Team Request    Medication: Ajovy 225mg/1.5ml  Dosin.5 mls every 30 days  Qty: 3mls  Day Supply: 60  NDC (required for Medicaid members): [address]-10     Insurance   BIN: [id number]  PCN: HUMPHREY  Grp: [id number]  ID: [id number]    CoverMyMeds Key (if applicable):     Additional documentation: Aimovig not woking, MD changed to Ajovy      Filling Pharmacy: [facility]  Phone Number: [phone]  Contact:    Pharmacy NPI (required for Medicaid members): [id number]

## 2019-03-07 NOTE — PROGRESS NOTES
[facility]    Yady Aquino MRN# [id number]   Age: 48 year old YOB: 1970     Requesting physician: Bogdan Gary     Chief Complaint:  Return visit for migraines    History of Present Illness:  Yady Aquino is a 48-year-old woman with chronic intractable migraine headaches.  The patient comes in for her second appointment here having not been seen in person since June 2018.  She lives in [place] and has come a long way to be seen here.  The patient has had migraine headaches since childhood but in December 2014 they became constant with an intractable nature.  In addition to headache she has neck pain that is constantly present as well.    The patient has mainly a throbbing or pulsatile type pain that can average 4-5 out of 10 on the pain scale flares up to 8 out of 10.  Riding in the car is a particularly uncomfortable trigger for her so she reports that she feels worse today.  She does have light and sound sensitivity with her headache she also has nausea but no vomiting.    Previous treatment preventive trials:  Verapamil 180 mg caused constipation, no help  Botox 4 treatments Q 12 weeks  Topamax 25 mg BID blurred vision, stomach upset  Keppra 250 mg 1-2 tabs BID  Effexor dose ?  Lamictal dose ? Rash  Gabapentin dose ?  Hydroxyzine 50 mg Q AM, 50 mg noon and 100 mg Q HS  Nortriptyline 25 mg didn't work, dry mouth, more depressed  Aimovig 140 mg monthly subcutaneous injections for the past 6 months    The patient returns reporting Aimovig has made no difference in her headaches at all.  She is wondering what else can be tried.  She does not endorse any medication overuse headaches at this point in time.        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)     Hereditary hemochromatosis (H)     Intractable chronic migraine without aura     Myalgia and myositis     Neck pain     Personal history of other specified diseases(V13.89)     Screening for other and unspecified endocrine, nutritional, metabolic, and immunity disorders       Past Surgical History:   Procedure Laterality Date     XR CERVICAL RADIOFREQ ABLATION BILATERAL Bilateral        Social History     Socioeconomic History     Marital status: Single     Spouse name: Not on file     Number of children: Not on file     Years of education: Not on file     Highest education level: Not on file   Occupational History     Not on file   Social Needs     Financial resource strain: Not on file     Food insecurity:     Worry: Not on file     Inability: Not on file     Transportation needs:     Medical: Not on file     Non-medical: Not on file   Tobacco Use     Smoking status: Never Smoker     Smokeless tobacco: Never Used   Substance and Sexual Activity     Alcohol use: Yes     Comment: LESS THAN ONE PER WEEK     Drug use: No     Sexual activity: Yes     Partners: Male     Birth control/protection: Male Surgical   Lifestyle     Physical activity:     Days per week: Not on file     Minutes per session: Not on file     Stress: Not on file   Relationships     Social connections:     Talks on phone: Not on file     Gets together: Not on file     Attends Jehovah's witness service: Not on file     Active member of club or organization: Not on file     Attends meetings of clubs or organizations: Not on file     Relationship status: Not on file     Intimate partner violence:     Fear of current or ex partner: Not on file     Emotionally abused: Not on file     Physically abused: Not on file     Forced sexual activity: Not on file   Other Topics Concern     Parent/sibling w/  CABG, MI or angioplasty before 65F 55M? Not Asked   Social History Narrative     Not on file       Family History   Problem Relation Age of Onset     Hemochromatosis Sister      Migraines Sister         3 sisters     Hemochromatosis Brother        Current Outpatient Medications   Medication Sig     acetaminophen (TYLENOL) 500 MG tablet Take 500 mg by mouth as needed     albuterol (PROAIR HFA/PROVENTIL HFA/VENTOLIN HFA) 108 (90 Base) MCG/ACT Inhaler Inhale 2 puffs into the lungs as needed     baclofen (LIORESAL) 10 MG tablet Take 15 mg by mouth 3 times daily     buPROPion (WELLBUTRIN XL) 300 MG 24 hr tablet Take 300 mg by mouth daily     Calcium Carb-Cholecalciferol (CALCIUM + D3) 600-800 MG-UNIT TABS Take 600-800 mg by mouth daily     DiphenhydrAMINE HCl, Sleep, 25 MG CAPS Take 25 mg by mouth as needed     FOLIC ACID PO Take 800 mcg by mouth daily     fremanezumab-vfrm (AJOVY) SOSY subcutaneous Inject 1.5 mLs (225 mg) Subcutaneous every 30 days     ibuprofen (ADVIL/MOTRIN) 200 MG tablet as needed     ketorolac (TORADOL) 60 MG/2ML SOLN injection as needed     MAGNESIUM CITRATE  mg daily     meclizine (ANTIVERT) 25 MG tablet 25 mg as needed     montelukast (SINGULAIR) 10 MG tablet 10 mg daily     Multiple Vitamins-Minerals (MULTIVITAL) TABS 1 tablet daily     Nerve Stimulator (CEFALY KIT) NEMO 1 Device daily Dual device     ondansetron (ZOFRAN) 8 MG tablet as needed     Probiotic Product (SOLUBLE FIBER/PROBIOTICS PO)      ranitidine (ZANTAC) 150 MG tablet 150 mg as needed     triamcinolone (NASACORT ALLERGY 24HR) 55 MCG/ACT Inhaler daily     atenolol (TENORMIN) 25 MG tablet Take 25 mg by mouth daily     dihydroergotamine (DHE) 1 MG/ML injection Inject 1 mg into the muscle as needed     Ketorolac Tromethamine (TORADOL IM)      ondansetron (ZOFRAN) 4 MG tablet as needed     No current facility-administered medications for this visit.           Allergies   Allergen Reactions     Lamictal [Lamotrigine] Rash     rash  "    Metronidazole GI Disturbance     Metronidazole GI Disturbance     Seasonal Other (See Comments)       ROS: Please see HPI all other systems review and negative. See patient questionnaire below    Physical Examination:  /77 (BP Location: Left arm, Patient Position: Sitting, Cuff Size: Adult Regular)   Pulse 88   Temp 98.6  F (37  C) (Oral)   Resp 16   Ht 1.707 m (5' 7.21\")   Wt 60.7 kg (133 lb 14.4 oz)   SpO2 99%   BMI 20.84 kg/m    General Appearance:  The patient is well-groomed and cooperative with examination.  She is in no acute distress.  She does appear to have somewhat of a flat affect.  Neurological Examination  Cognition: oriented x3, attention and recall intact. No aphasia or dysarthria  Cranial Nerves: 2-12 intact. Funduscopic examination is normal with sharp disc margins bilaterally.     Investigations:    MRI brain with and without contrast April 20, 2015 performed at [facility]  Findings consistent with a few small foci of increased T2 signal within the cerebral white matter in a nonspecific matter otherwise normal.    MRI cervical spine without contrast performed August 20, 2015  Mild degenerative changes at C5-6, C6-7.  Posterior disc osteophyte complex seen at C5-6.  This results in mild canal narrowing and may be abutting the right ventral surface of the spinal cord at this level without significant displacement.  Mild canal narrowing at C6-7 with uncovertebral joint spurring.  Mild neuroforaminal narrowing with no obvious root impingement identified at other levels.    Impression/Recommendations:  1.  Chronic migraine headaches  The patient endorses chronic intractable migraine headaches that do not go away at any point time.  She is failed numerous therapies.  I really am running out of options at this point in time.  I think Ajovy would be the next 1 to try.  She should try this for 3 months at 225 mg once monthly injections.  If no change we will then try Emgality. She will " also try Cefaly device.    The patient is noted to report a lot of pain but does appear to have somewhat of a flat affect today.  I do question whether there is some functional overlay related to somatic disorder and psychiatric depression.  She might be a good candidate for the conference of headache program that is about to open up and second quarter of this year here where she could also see PM&R and health psychology at the same time for her visits.  I will consider enrolling her into the clinic when available.    Kourtney Willis MD [facility]  Department of Neurology  Pager [phone]      Answers for HPI/ROS submitted by the patient on 2/20/2019   General Symptoms: Yes  Skin Symptoms: No  HENT Symptoms: Yes  EYE SYMPTOMS: Yes  HEART SYMPTOMS: No  LUNG SYMPTOMS: No  INTESTINAL SYMPTOMS: Yes  URINARY SYMPTOMS: No  GYNECOLOGIC SYMPTOMS: No  BREAST SYMPTOMS: No  SKELETAL SYMPTOMS: Yes  BLOOD SYMPTOMS: No  NERVOUS SYSTEM SYMPTOMS: Yes  MENTAL HEALTH SYMPTOMS: Yes  Fever: No  Loss of appetite: Yes  Weight loss: Yes  Weight gain: No  Fatigue: Yes  Night sweats: Yes  Chills: No  Increased stress: Yes  Excessive hunger: No  Excessive thirst: No  Feeling hot or cold when others believe the temperature is normal: No  Loss of height: No  Post-operative complications: No  Surgical site pain: No  Hallucinations: No  Change in or Loss of Energy: No  Hyperactivity: No  Confusion: No  Ear pain: No  Ear discharge: No  Hearing loss: No  Tinnitus: Yes  Nosebleeds: No  Congestion: Yes  Sinus pain: No  Trouble swallowing: No   Voice hoarseness: No  Mouth sores: No  Sore throat: No  Tooth pain: No  Gum tenderness: No  Bleeding gums: No  Change in taste: No  Change in sense of smell: No  Dry mouth: No  Hearing aid used: No  Neck lump: No  Eye pain: Yes  Vision loss: No  Dry eyes: No  Watery eyes: No  Eye bulging: No  Double vision: No  Flashing of lights: No  Spots: No  Floaters: No  Redness: No  Crossed eyes: No  Tunnel Vision:  No  Yellowing of eyes: No  Eye irritation: No  Heart burn or indigestion: No  Nausea: Yes  Vomiting: No  Abdominal pain: No  Bloating: No  Constipation: No  Diarrhea: No  Blood in stool: No  Black stools: No  Rectal or Anal pain: No  Fecal incontinence: No  Yellowing of skin or eyes: No  Vomit with blood: No  Change in stools: No  Back pain: Yes  Muscle aches: Yes  Neck pain: Yes  Swollen joints: No  Joint pain: Yes  Bone pain: No  Muscle cramps: No  Muscle weakness: No  Joint stiffness: Yes  Bone fracture: No  Trouble with coordination: No  Dizziness or trouble with balance: Yes  Fainting or black-out spells: No  Memory loss: No  Headache: Yes  Seizures: No  Speech problems: No  Tingling: Yes  Tremor: No  Weakness: No  Difficulty walking: No  Paralysis: No  Numbness: Yes  Nervous or Anxious: Yes  Depression: Yes  Trouble sleeping: No  Trouble thinking or concentrating: Yes  Mood changes: Yes  Panic attacks: No

## 2019-03-11 NOTE — TELEPHONE ENCOUNTER
PA Initiation    Medication: Ajovy 225mg  Insurance Company: [employer] - Phone [phone] Fax [phone]  Pharmacy Filling the Rx:    Filling Pharmacy Phone:    Filling Pharmacy Fax:    Start Date: 3/11/2019    [facility] Authorization Team   Phone: [phone]  Fax: [phone]

## 2019-03-12 NOTE — TELEPHONE ENCOUNTER
Prior Authorization Approval    Authorization Effective Date: 12/30/2018  Authorization Expiration Date: 12/30/2019  Medication: Ajovy 225mg  Approved Dose/Quantity: 3  Reference #: (Key: [id number])   Insurance Company: [employer] - Phone [phone] Fax [phone]  Expected CoPay:       CoPay Card Available:      Foundation Assistance Needed:    Which Pharmacy is filling the prescription (Not needed for infusion/clinic administered):    Pharmacy Notified: No  Patient Notified: [facility] Prior Authorization Team   Phone: [phone]  Fax: [phone]

## 2019-11-08 ENCOUNTER — HEALTH MAINTENANCE LETTER (OUTPATIENT)
Age: 49
End: 2019-11-08

## 2019-12-23 ASSESSMENT — ENCOUNTER SYMPTOMS
INSOMNIA: 1
NIGHT SWEATS: 1
EYE WATERING: 0
NUMBNESS: 1
DOUBLE VISION: 0
BACK PAIN: 1
NERVOUS/ANXIOUS: 0
LOSS OF CONSCIOUSNESS: 0
HALLUCINATIONS: 0
HOARSE VOICE: 0
EYE PAIN: 1
NECK MASS: 0
WHEEZING: 0
TINGLING: 1
EYE IRRITATION: 0
TREMORS: 0
DISTURBANCES IN COORDINATION: 0
CHILLS: 0
TROUBLE SWALLOWING: 0
SORE THROAT: 0
HEMOPTYSIS: 0
INCREASED ENERGY: 0
SINUS CONGESTION: 1
POLYPHAGIA: 0
DECREASED APPETITE: 0
COUGH DISTURBING SLEEP: 1
EYE REDNESS: 0
HEMATURIA: 0
SHORTNESS OF BREATH: 0
POLYDIPSIA: 0
SKIN CHANGES: 0
SINUS PAIN: 0
WEIGHT LOSS: 0
FATIGUE: 1
JOINT SWELLING: 0
MUSCLE WEAKNESS: 0
FLANK PAIN: 0
DYSURIA: 0
SMELL DISTURBANCE: 0
POSTURAL DYSPNEA: 0
PANIC: 0
SNORES LOUDLY: 0
DECREASED CONCENTRATION: 0
MEMORY LOSS: 0
MYALGIAS: 1
NECK PAIN: 1
NAIL CHANGES: 0
STIFFNESS: 1
WEIGHT GAIN: 0
ALTERED TEMPERATURE REGULATION: 0
SPEECH CHANGE: 0
WEAKNESS: 0
FEVER: 0
ARTHRALGIAS: 1
SEIZURES: 0
DYSPNEA ON EXERTION: 0
PARALYSIS: 0
TASTE DISTURBANCE: 0
DIZZINESS: 1
MUSCLE CRAMPS: 1
DEPRESSION: 1
HEADACHES: 1
POOR WOUND HEALING: 0
SPUTUM PRODUCTION: 1
COUGH: 1
DIFFICULTY URINATING: 0

## 2020-01-06 ENCOUNTER — OFFICE VISIT (OUTPATIENT)
Dept: NEUROLOGY | Facility: CLINIC | Age: 50
End: 2020-01-06
Payer: MEDICARE

## 2020-01-06 ENCOUNTER — TELEPHONE (OUTPATIENT)
Dept: NEUROLOGY | Facility: CLINIC | Age: 50
End: 2020-01-06

## 2020-01-06 VITALS
OXYGEN SATURATION: 99 % | SYSTOLIC BLOOD PRESSURE: 120 MMHG | BODY MASS INDEX: 21.17 KG/M2 | WEIGHT: 136 LBS | HEART RATE: 91 BPM | DIASTOLIC BLOOD PRESSURE: 77 MMHG

## 2020-01-06 DIAGNOSIS — G43.719 INTRACTABLE CHRONIC MIGRAINE WITHOUT AURA AND WITHOUT STATUS MIGRAINOSUS: ICD-10-CM

## 2020-01-06 RX ORDER — HYDROXYZINE HYDROCHLORIDE 25 MG/1
25 TABLET, FILM COATED ORAL 3 TIMES DAILY PRN
Qty: 30 TABLET | Refills: 11 | Status: SHIPPED | OUTPATIENT
Start: 2020-01-06 | End: 2021-01-05

## 2020-01-06 ASSESSMENT — PATIENT HEALTH QUESTIONNAIRE - PHQ9: SUM OF ALL RESPONSES TO PHQ QUESTIONS 1-9: 3

## 2020-01-06 ASSESSMENT — PAIN SCALES - GENERAL: PAINLEVEL: MODERATE PAIN (5)

## 2020-01-06 NOTE — PROGRESS NOTES
[facility]    Yady Aquino MRN# [id number]   Age: 49 year old YOB: 1970     Requesting physician: Bogdan Gary            Assessment and Plan:   Assessment:  1.  Chronic migraine headache     Plan:  The patient does feel Ajovy is helping make her headaches less severe and the flares happen less frequently.  She would like to continue with this medication.  She thinks it is more effective than Aimovig.    She will continue to use the Cefaly device.  With her flare she will use Toradol plus hydroxyzine.  Again triptan's have not been beneficial to her in the past.    The patient will add tizanidine as a daily preventive medicine in addition to Ajovy.    She will contact me in 3 months time to report how this is working and whether it should be continued.    35 minutes spent with the patient over 50% counseling.    Kourtney Willis MD [facility]  Department of Neurology  Pager [phone]             History of Present Illness:   CC: Recheck for migraine    Yady is a 49-year-old woman with chronic migraines.  She has constant headache all the time with flares.  Her constant pain is about a 4 out of 10 and then 3 days a week she is flared higher than that.  Typically when she gets a flare last 1 to 2 days at a time.  Triggers for the flare can be weather changes and stress but other times there seems to be no clear rhyme or reason.  The patient treats her flares with Toradol or ibuprofen using less than twice a week.  Triptan's have not helped.    She has been using Ajovy and reports that she does think it helps take the edge off.  She has no significant side effects from the injection.    Ajovy injected the 20th of each month.   Cefaly device used 4 days a week    Exercise 3 days a week. Gentle yoga.  Water drinking 2-3 a day  Caffeine seldom  Sleep: 6-7 hours.      Previous treatment preventive trials:  Verapamil 180 mg caused constipation, no help  Botox 4 treatments Q 12  weeks helped a little but did not reduce by 50%   Topamax 25 mg BID blurred vision, stomach upset  Keppra 250 mg 1-2 tabs BID  Effexor dose ?  Lamictal dose ? Rash  Gabapentin dose ?  Atenolol 25 mg not effective  Hydroxyzine 50 mg Q AM, 50 mg noon and 100 mg Q HS  Nortriptyline 25 mg didn't work, dry mouth, more depressed  Aimovig 140 mg monthly subcutaneous injections for the past 6 months not effective     She takes Wellbutrin for depression.  PHQ-9 SCORE 1/6/2020   PHQ-9 Total Score 3     MIDAS:  9 (poor reporting/tracking) her  points out that he often has to encourage her not to participate in some activity           Physical Exam:   /77 (BP Location: Left arm, Patient Position: Sitting, Cuff Size: Adult Regular)   Pulse 91   Wt 61.7 kg (136 lb)   SpO2 99%   BMI 21.17 kg/m    General appearance: The patient is well-groomed and cooperative with examination.  She is in no acute distress  Neurological examination: The patient is awake and alert.  She is oriented.  No aphasia or dysarthria.  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       Past Surgical History:     Past Surgical History:   Procedure Laterality Date     XR CERVICAL RADIOFREQ ABLATION BILATERAL Bilateral             Social History:     Social History     Socioeconomic History     Marital status: Single     Spouse name: Not on file     Number of children: Not on file     Years of education: Not on file     Highest education level: Not on file   Occupational History     Not on file   Social Needs     Financial resource strain: Not on file     Food insecurity:     Worry: Not on file     Inability: Not on file     Transportation needs:     Medical: Not on file     Non-medical: Not on file   Tobacco Use     Smoking status: Never Smoker     Smokeless tobacco: Never Used   Substance and Sexual Activity     Alcohol use: Yes     Comment: LESS THAN ONE PER WEEK     Drug use: No     Sexual activity: Yes     Partners: Male     Birth control/protection: Male Surgical   Lifestyle     Physical activity:     Days per week: Not on file     Minutes per session: Not on file     Stress: Not on file   Relationships     Social connections:     Talks on phone: Not on file     Gets together: Not on file     Attends [employer] service: Not on file     Active member of club or organization: Not on file     Attends meetings of clubs or organizations: Not on file     Relationship status: Not on file     Intimate partner violence:     Fear of current or ex partner: Not on file     Emotionally abused: Not on file     Physically abused: Not on file     Forced sexual activity: Not on file   Other Topics Concern     Parent/sibling w/ CABG, MI or angioplasty before 65F 55M? Not Asked   Social History Narrative     Not on file              Family History:     Family History   Problem Relation Age of Onset     Hemochromatosis Sister      Migraines Sister         3 sisters     Hemochromatosis Brother             Medications:     Current Outpatient Medications   Medication Sig Dispense Refill     acetaminophen  (TYLENOL) 500 MG tablet Take 500 mg by mouth as needed       albuterol (PROAIR HFA/PROVENTIL HFA/VENTOLIN HFA) 108 (90 Base) MCG/ACT Inhaler Inhale 2 puffs into the lungs as needed       baclofen (LIORESAL) 10 MG tablet Take 15 mg by mouth 3 times daily       buPROPion (WELLBUTRIN XL) 300 MG 24 hr tablet Take 300 mg by mouth daily       Calcium Carb-Cholecalciferol (CALCIUM + D3) 600-800 MG-UNIT TABS Take 600-800 mg by mouth daily       DiphenhydrAMINE HCl, Sleep, 25 MG CAPS Take 25 mg by mouth as needed       FOLIC ACID PO Take 800 mcg by mouth daily       fremanezumab-vfrm (AJOVY) SOSY subcutaneous Inject 1.5 mLs (225 mg) Subcutaneous every 30 days 3 Syringe 3     ibuprofen (ADVIL/MOTRIN) 200 MG tablet as needed       ketorolac (TORADOL) 60 MG/2ML SOLN injection as needed       MAGNESIUM CITRATE  mg daily       meclizine (ANTIVERT) 25 MG tablet 25 mg as needed       montelukast (SINGULAIR) 10 MG tablet 10 mg daily       Multiple Vitamins-Minerals (MULTIVITAL) TABS 1 tablet daily       Nerve Stimulator (CEFALY KIT) NEMO 1 Device daily Dual device 1 Device 1     ondansetron (ZOFRAN) 8 MG tablet as needed       Probiotic Product (SOLUBLE FIBER/PROBIOTICS PO) Take 1 capsule by mouth daily        triamcinolone (NASACORT ALLERGY 24HR) 55 MCG/ACT Inhaler Spray 2 sprays into both nostrils daily                 Review of Systems:   A comprehensive 10 point review of systems (constitutional, ENT, cardiac, peripheral vascular, lymphatic, respiratory, GI, , Musculoskeletal, skin, Neurological) was performed and found to be negative except as described in this note.     See intake form completed by patient  Answers for HPI/ROS submitted by the patient on 12/23/2019   General Symptoms: Yes  Skin Symptoms: Yes  HENT Symptoms: Yes  EYE SYMPTOMS: Yes  HEART SYMPTOMS: No  LUNG SYMPTOMS: Yes  INTESTINAL SYMPTOMS: No  URINARY SYMPTOMS: Yes  GYNECOLOGIC SYMPTOMS: No  BREAST SYMPTOMS: No  SKELETAL SYMPTOMS: Yes  BLOOD SYMPTOMS:  No  NERVOUS SYSTEM SYMPTOMS: Yes  MENTAL HEALTH SYMPTOMS: Yes  Fever: No  Loss of appetite: No  Weight loss: No  Weight gain: No  Fatigue: Yes  Night sweats: Yes  Chills: No  Increased stress: Yes  Excessive hunger: No  Excessive thirst: No  Feeling hot or cold when others believe the temperature is normal: No  Loss of height: No  Post-operative complications: No  Surgical site pain: No  Hallucinations: No  Change in or Loss of Energy: No  Hyperactivity: No  Confusion: No  Changes in hair: No  Changes in moles/birth marks: No  Itching: Yes  Rashes: No  Changes in nails: No  Acne: No  Hair in places you don't want it: No  Change in facial hair: No  Warts: No  Non-healing sores: No  Scarring: No  Flaking of skin: No  Color changes of hands/feet in cold : No  Sun sensitivity: No  Skin thickening: No  Ear pain: Yes  Ear discharge: No  Hearing loss: No  Tinnitus: Yes  Nosebleeds: No  Congestion: Yes  Sinus pain: No  Trouble swallowing: No   Voice hoarseness: No  Mouth sores: No  Sore throat: No  Tooth pain: No  Gum tenderness: No  Bleeding gums: No  Change in taste: No  Change in sense of smell: No  Dry mouth: No  Hearing aid used: No  Neck lump: No  Eye pain: Yes  Vision loss: No  Dry eyes: No  Watery eyes: No  Eye bulging: No  Double vision: No  Flashing of lights: No  Spots: No  Floaters: No  Redness: No  Crossed eyes: No  Tunnel Vision: No  Yellowing of eyes: No  Eye irritation: No  Cough: Yes  Sputum or phlegm: Yes  Coughing up blood: No  Difficulty breating or shortness of breath: No  Snoring: No  Wheezing: No  Difficulty breathing on exertion: No  Nighttime Cough: Yes  Difficulty breathing when lying flat: No  Trouble holding urine or incontinence: Yes  Pain or burning: No  Trouble starting or stopping: No  Increased frequency of urination: No  Blood in urine: No  Decreased frequency of urination: No  Frequent nighttime urination: No  Flank pain: No  Difficulty emptying bladder: No  Back pain: Yes  Muscle aches:  Yes  Neck pain: Yes  Swollen joints: No  Joint pain: Yes  Bone pain: No  Muscle cramps: Yes  Muscle weakness: No  Joint stiffness: Yes  Bone fracture: No  Trouble with coordination: No  Dizziness or trouble with balance: Yes  Fainting or black-out spells: No  Memory loss: No  Headache: Yes  Seizures: No  Speech problems: No  Tingling: Yes  Tremor: No  Weakness: No  Difficulty walking: No  Paralysis: No  Numbness: Yes  Nervous or Anxious: No  Depression: Yes  Trouble sleeping: Yes  Trouble thinking or concentrating: No  Mood changes: No  Panic attacks: No

## 2020-01-06 NOTE — TELEPHONE ENCOUNTER
PA Initiation    Medication: fremanezumab-vfrm (AJOVY) SOSY subcutaneous; Inject 1.5 mLs (225 mg) Subcutaneous every 30 days - Subcutaneous  Insurance Company: [employer] - Phone [phone] Fax [phone]  Pharm[place]illing the Rx[place] [facility] [place] [address]  Filling Pharmacy Phone: [phone]  Filling Pharmacy Fax:    Start Date: 1/6/2020    Central Prior Authorization Team   Phone: [phone]

## 2020-01-06 NOTE — NURSING NOTE
Chief Complaint   Patient presents with     RECHECK     [facility] RETURN - HEADACHES       Woo Benavides, EMT

## 2020-01-06 NOTE — TELEPHONE ENCOUNTER
Prior Authorization Retail Medication Request    Medication/Dose: fremanezumab-vfrm (AJOVY) SOSY subcutaneous; Inject 1.5 mLs (225 mg) Subcutaneous every 30 days - Subcutaneous  ICD code (if different than what is on RX):  G43.719  Previously Tried and Failed:    She will continue to use the Cefaly device.  With her flare she will use Toradol plus hydroxyzine.  Again triptan's have not been beneficial to her in the past   Rationale:  The patient does feel Ajovy is helping make her headaches less severe and the flares happen less frequently.  She would like to continue with this medication.  She thinks it is more effective than Aimovig.    Insurance Name:  [employer]  Insurance ID:  [id number]      Pharmacy Information (if different than what is on RX)  Name:    Phone:

## 2020-01-06 NOTE — LETTER
1/6/2020       RE: Yady Aquino  [address]  [place] [facility] [place] [address]     Dear Colleague,    Thank you for referring your patient, Yady Aquino, to the [facility] NEUROLOGY at [facility]. Please see a copy of my visit note below.        [facility]    Yady Aquino MRN# [id number]   Age: 49 year old YOB: 1970     Requesting physician: Bogdan Gary            Assessment and Plan:   Assessment:  1.  Chronic migraine headache     Plan:  The patient does feel Ajovy is helping make her headaches less severe and the flares happen less frequently.  She would like to continue with this medication.  She thinks it is more effective than Aimovig.    She will continue to use the Cefaly device.  With her flare she will use Toradol plus hydroxyzine.  Again triptan's have not been beneficial to her in the past.    The patient will add tizanidine as a daily preventive medicine in addition to Ajovy.    She will contact me in 3 months time to report how this is working and whether it should be continued.    35 minutes spent with the patient over 50% counseling.    Kourtney Willis MD [facility]  Department of Neurology  Pager [phone]             History of Present Illness:   CC: Recheck for migraine    Yady is a 49-year-old woman with chronic migraines.  She has constant headache all the time with flares.  Her constant pain is about a 4 out of 10 and then 3 days a week she is flared higher than that.  Typically when she gets a flare last 1 to 2 days at a time.  Triggers for the flare can be weather changes and stress but other times there seems to be no clear rhyme or reason.  The patient treats her flares with Toradol or ibuprofen using less than twice a week.  Triptan's have not helped.    She has been using Ajovy and reports that she does think it helps take the edge off.  She has no significant side effects from the injection.    Ajovy injected the  20th of each month.   Cefaly device used 4 days a week    Exercise 3 days a week. Gentle yoga.  Water drinking 2-3 a day  Caffeine seldom  Sleep: 6-7 hours.    Previous treatment preventive trials:  Verapamil 180 mg caused constipation, no help  Botox 4 treatments Q 12 weeks helped a little but did not reduce by 50%   Topamax 25 mg BID blurred vision, stomach upset  Keppra 250 mg 1-2 tabs BID  Effexor dose ?  Lamictal dose ? Rash  Gabapentin dose ?  Atenolol 25 mg not effective  Hydroxyzine 50 mg Q AM, 50 mg noon and 100 mg Q HS  Nortriptyline 25 mg didn't work, dry mouth, more depressed  Aimovig 140 mg monthly subcutaneous injections for the past 6 months not effective     She takes Wellbutrin for depression.  PHQ-9 SCORE 1/6/2020   PHQ-9 Total Score 3     MIDAS:  9 (poor reporting/tracking) her  points out that he often has to encourage her not to participate in some activity           Physical Exam:   /77 (BP Location: Left arm, Patient Position: Sitting, Cuff Size: Adult Regular)   Pulse 91   Wt 61.7 kg (136 lb)   SpO2 99%   BMI 21.17 kg/m     General appearance: The patient is well-groomed and cooperative with examination.  She is in no acute distress  Neurological examination: The patient is awake and alert.  She is oriented.  No aphasia or dysarthria.  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       Past Surgical History:     Past Surgical History:   Procedure Laterality Date     XR CERVICAL RADIOFREQ ABLATION BILATERAL Bilateral             Social History:     Social History     Socioeconomic History     Marital status: Single     Spouse name: Not on file     Number of children: Not on file     Years of education: Not on file     Highest education level: Not on file   Occupational History     Not on file   Social Needs     Financial resource strain: Not on file     Food insecurity:     Worry: Not on file     Inability: Not on file     Transportation needs:     Medical: Not on file     Non-medical: Not on file   Tobacco Use     Smoking status: Never Smoker     Smokeless tobacco: Never Used   Substance and Sexual Activity     Alcohol use: Yes     Comment: LESS THAN ONE PER WEEK     Drug use: No     Sexual activity: Yes     Partners: Male     Birth control/protection: Male Surgical   Lifestyle     Physical activity:     Days per week: Not on file     Minutes per session: Not on file     Stress: Not on file   Relationships     Social connections:     Talks on phone: Not on file     Gets together: Not on file     Attends [employer]ism service: Not on file     Active member of club or organization: Not on file     Attends meetings of clubs or organizations: Not on file     Relationship status: Not on file     Intimate partner violence:     Fear of current or ex partner: Not on file     Emotionally abused: Not on file     Physically abused: Not on file     Forced sexual activity: Not on file   Other Topics Concern     Parent/sibling w/ CABG, MI or angioplasty before 65F 55M? Not Asked   Social History Narrative     Not on file               Family History:     Family History   Problem Relation Age of Onset     Hemochromatosis Sister      Migraines Sister         3 sisters     Hemochromatosis Brother             Medications:     Current Outpatient Medications   Medication Sig Dispense Refill     acetaminophen (TYLENOL) 500 MG tablet Take 500 mg by mouth as needed       albuterol (PROAIR HFA/PROVENTIL HFA/VENTOLIN HFA) 108 (90 Base) MCG/ACT Inhaler Inhale 2 puffs into the lungs as needed       baclofen (LIORESAL) 10 MG tablet Take 15 mg by mouth 3 times daily       buPROPion (WELLBUTRIN XL) 300 MG 24 hr tablet Take 300 mg by mouth daily       Calcium Carb-Cholecalciferol (CALCIUM + D3) 600-800 MG-UNIT TABS Take 600-800 mg by mouth daily       DiphenhydrAMINE HCl, Sleep, 25 MG CAPS Take 25 mg by mouth as needed       FOLIC ACID PO Take 800 mcg by mouth daily       fremanezumab-vfrm (AJOVY) SOSY subcutaneous Inject 1.5 mLs (225 mg) Subcutaneous every 30 days 3 Syringe 3     ibuprofen (ADVIL/MOTRIN) 200 MG tablet as needed       ketorolac (TORADOL) 60 MG/2ML SOLN injection as needed       MAGNESIUM CITRATE  mg daily       meclizine (ANTIVERT) 25 MG tablet 25 mg as needed       montelukast (SINGULAIR) 10 MG tablet 10 mg daily       Multiple Vitamins-Minerals (MULTIVITAL) TABS 1 tablet daily       Nerve Stimulator (CEFALY KIT) NEMO 1 Device daily Dual device 1 Device 1     ondansetron (ZOFRAN) 8 MG tablet as needed       Probiotic Product (SOLUBLE FIBER/PROBIOTICS PO) Take 1 capsule by mouth daily        triamcinolone (NASACORT ALLERGY 24HR) 55 MCG/ACT Inhaler Spray 2 sprays into both nostrils daily                 Review of Systems:   A comprehensive 10 point review of systems (constitutional, ENT, cardiac, peripheral vascular, lymphatic, respiratory, GI, , Musculoskeletal, skin, Neurological) was performed and found to be negative except as described in this note.     See intake form completed by patient    Again, thank you for allowing me to  participate in the care of your patient.      Sincerely,    Kourtney Willis MD

## 2020-01-07 ENCOUNTER — MYC MEDICAL ADVICE (OUTPATIENT)
Dept: NEUROLOGY | Facility: CLINIC | Age: 50
End: 2020-01-07

## 2020-01-07 DIAGNOSIS — G43.719 INTRACTABLE CHRONIC MIGRAINE WITHOUT AURA AND WITHOUT STATUS MIGRAINOSUS: ICD-10-CM

## 2020-01-07 NOTE — TELEPHONE ENCOUNTER
Prior Authorization Not Needed per Insurance    Medication: fremanezumab-vfrm (AJOVY) SOSY subcutaneous; Inject 1.5 mLs (225 mg) Subcutaneous every 30 days - Subcutaneous  Insurance Company: [employer] - Phone [phone] Fax [phone]  Pharmacy [place]illing the Rx: [facility] [place] - [address] [facility]  Pharmacy Notified: Yes- relayed that patient last picked up a 90 day supply on 12/9/19 at the [facility] and this is just a refill too soon until 02/10/2020.

## 2020-02-23 ENCOUNTER — HEALTH MAINTENANCE LETTER (OUTPATIENT)
Age: 50
End: 2020-02-23

## 2020-12-06 ENCOUNTER — HEALTH MAINTENANCE LETTER (OUTPATIENT)
Age: 50
End: 2020-12-06

## 2021-01-05 ENCOUNTER — VIRTUAL VISIT (OUTPATIENT)
Dept: NEUROLOGY | Facility: CLINIC | Age: 51
End: 2021-01-05
Payer: MEDICARE

## 2021-01-05 DIAGNOSIS — G43.719 INTRACTABLE CHRONIC MIGRAINE WITHOUT AURA AND WITHOUT STATUS MIGRAINOSUS: Primary | ICD-10-CM

## 2021-01-05 PROCEDURE — 99213 OFFICE O/P EST LOW 20 MIN: CPT | Mod: 95 | Performed by: PSYCHIATRY & NEUROLOGY

## 2021-01-05 RX ORDER — HYDROXYZINE HYDROCHLORIDE 25 MG/1
25 TABLET, FILM COATED ORAL 3 TIMES DAILY PRN
Qty: 30 TABLET | Refills: 11 | Status: SHIPPED | OUTPATIENT
Start: 2021-01-05 | End: 2022-01-10

## 2021-01-05 RX ORDER — FREMANEZUMAB-VFRM 225 MG/1.5ML
225 INJECTION SUBCUTANEOUS
Qty: 3 SYRINGE | Refills: 3 | Status: SHIPPED | OUTPATIENT
Start: 2021-03-08 | End: 2021-09-28

## 2021-01-05 RX ORDER — RIMEGEPANT SULFATE 75 MG/75MG
75 TABLET, ORALLY DISINTEGRATING ORAL DAILY PRN
Qty: 8 TABLET | Refills: 11 | Status: SHIPPED | OUTPATIENT
Start: 2021-01-05 | End: 2021-04-08

## 2021-01-05 NOTE — PROGRESS NOTES
Yady Aquino is a 50 year old female who is being evaluated via a billable video visit.      How would you like to obtain your AVS? [email or web address]      Video Start Time: 7:56AM    Assessment & Plan     Intractable chronic migraine without aura and without status migrainosus    Yady has better migraine control now on Ajovy I recommend continuing. She will try Nurtec (Ubrelvy would be fine if insurance prefers) for acute treatment at triptans have not worked in the past.     - fremanezumab-vfrm (AJOVY) SOSY subcutaneous; Inject 1.5 mLs (225 mg) Subcutaneous every 30 days  - hydrOXYzine (ATARAX) 25 MG tablet; Take 1 tablet (25 mg) by mouth 3 times daily as needed for other (Migraine)  - tiZANidine (ZANAFLEX) 4 MG tablet; Take 1 tablet (4 mg) by mouth At Bedtime  - Rimegepant Sulfate (NURTEC) 75 MG TBDP; Take 75 mg by mouth daily as needed (migraine. Take at onset of migraine)    35 minutes spent with the patient on  Video, chart review and documentation.  Follow up in one year.     Kourtney Willis MD  [facility] NEUROLOGY [facility]         HPI     One year follow up with Yady for chronic migraine. She is on Ajovy nd believes her headaches are doing better.     Recently had COVID and cough made headache worse. She notes symptoms are worse with stress. She does do meditation. She also tends to separate from family    October used Toradol 6 times, Cefaly one hour acute setting 7 times, ibuprofen 6 times  November caught COVID. Cough triggered headache. Still mild cough. Lost smell and taste and had upset stomach.     Maintaining weight.   Sleep was disrupted but getting better now.     Next Ajovy injection January 20th. No side effects Different insurance. Using preloaded syringe. Inject in the leg. She does have new insurance. In past she tried Aimovig 140 mg without benefit. Also has tried and failed verapamil, Botox, Topamax, Keppra, Lamictal, Gabapentin, and Nortriptyline.    In past  for acute treatment she has tried Imitrex, Maxalt, Relpax, Zomig, Axert without benefit. She is currently using Toradol, Cefaly and ibuprofen for acute management. Toradol works the best.       Video-Visit Details    Type of service:  Video Visit    Video End Time:8:27 AM    Originating Location (pt. Location): Home    Distant Location (provider location):  [facility] NEUROLOGY [facility]     Platform used for Video Visit: John Mckeon, EMT

## 2021-01-05 NOTE — LETTER
1/5/2021       RE: Yady Aquino  [address]  [facility] [address]     Dear Colleague,    Thank you for referring your patient, Yady Aquino, to the [facility] [place] [facility]. Please see a copy of my visit note below.    Yady Aquino is a 50 year old female who is being evaluated via a billable video visit.      How would you like to obtain your AVS? Zola Bookst      Video Start Time: 7:56AM    Assessment & Plan     Intractable chronic migraine without aura and without status migrainosus    Yady has better migraine control now on Ajovy I recommend continuing. She will try Nurtec (Ubrelvy would be fine if insurance prefers) for acute treatment at triptans have not worked in the past.     - fremanezumab-vfrm (AJOVY) SOSY subcutaneous; Inject 1.5 mLs (225 mg) Subcutaneous every 30 days  - hydrOXYzine (ATARAX) 25 MG tablet; Take 1 tablet (25 mg) by mouth 3 times daily as needed for other (Migraine)  - tiZANidine (ZANAFLEX) 4 MG tablet; Take 1 tablet (4 mg) by mouth At Bedtime  - Rimegepant Sulfate (NURTEC) 75 MG TBDP; Take 75 mg by mouth daily as needed (migraine. Take at onset of migraine)    35 minutes spent with the patient on  Video, chart review and documentation.  Follow up in one year.     Kourtney Willis MD  [facility] [place]    Subjective         HPI     One year follow up with Yady for chronic migraine. She is on Ajovy nd believes her headaches are doing better.     Recently had COVID and cough made headache worse. She notes symptoms are worse with stress. She does do meditation. She also tends to separate from family    October used Toradol 6 times, Cefaly one hour acute setting 7 times, ibuprofen 6 times  November caught COVID. Cough triggered headache. Still mild cough. Lost smell and taste and had upset stomach.     Maintaining weight.   Sleep was disrupted but getting better now.     Next  Ajovy injection January 20th. No side effects Different insurance. Using preloaded syringe. Inject in the leg. She does have new insurance. In past she tried Aimovig 140 mg without benefit. Also has tried and failed verapamil, Botox, Topamax, Keppra, Lamictal, Gabapentin, and Nortriptyline.    In past for acute treatment she has tried Imitrex, Maxalt, Relpax, Zomig, Axert without benefit. She is currently using Toradol, Cefaly and ibuprofen for acute management. Toradol works the best.       Video-Visit Details    Type of service:  Video Visit    Video End Time:8:27 AM    Originating Location (pt. Location): Home    Distant Location (provider location):  [facility] NEUROLOGY [facility]     Platform used for Video Visit: John Mckeon, EMT          Again, thank you for allowing me to participate in the care of your patient.      Sincerely,    Kourtney Willis MD

## 2021-02-03 ENCOUNTER — TELEPHONE (OUTPATIENT)
Dept: NEUROLOGY | Facility: CLINIC | Age: 51
End: 2021-02-03

## 2021-02-03 NOTE — TELEPHONE ENCOUNTER
Prior Authorization Retail Medication Request    Medication/Dose: fremanezumab-vfrm (AJOVY) SOSY subcutaneous; Inject 1.5 mLs (225 mg) Subcutaneous every 30 days - Subcutaneous    ICD code (if different than what is on RX):  G43.719  Previously Tried and Failed:  Aimovig 140 mg without benefit. Also has tried and failed verapamil, Botox, Topamax, Keppra, Lamictal, Gabapentin, and Nortriptyline.In past for acute treatment she has tried Imitrex, Maxalt, Relpax, Zomig, Axert without benefit. She is currently using Toradol, Cefaly and ibuprofen for acute management. Toradol works the best.      Rationale:  Chronic migraine. Pt has been using Ajovy for a year with good relief. She feels her migraines are doing better.      Insurance Name:  medicare  Insurance ID:  [id number]         Pharmacy Information (if different than what is on RX)  Name:    Phone:

## 2021-02-03 NOTE — TELEPHONE ENCOUNTER
Prior Authorization Retail Medication Request    Medication/Dose: Rimegepant Sulfate (NURTEC) 75 MG TBDP; Take 75 mg by mouth daily as needed (migraine. Take at onset of migraine) - Oral  ICD code (if different than what is on RX):  G43.719  Previously Tried and Failed:  Aimovig 140 mg without benefit. Also has tried and failed verapamil, Botox, Topamax, Keppra, Lamictal, Gabapentin, and Nortriptyline.In past for acute treatment she has tried Imitrex, Maxalt, Relpax, Zomig, Axert without benefit. She is currently using Toradol, Cefaly and ibuprofen for acute management. Toradol works the best.      Rationale:  Chronic migraine    Insurance Name:  medicare  Insurance ID:  [id number]       Pharmacy Information (if different than what is on RX)  Name:    Phone:

## 2021-02-03 NOTE — TELEPHONE ENCOUNTER
PA Initiation    Medication: fremanezumab-vfrm (AJOVY) SOSY subcutaneous; Inject 1.5 mLs (225 mg) Subcutaneous every 30 days - Subcutaneous  Insurance Company: [employer] - Phone [phone] Fax [phone]  Pharmacy Filling the Rx: [facility] - [place], [place] - [address]  Filling Pharmacy Phone: [phone]  Filling Pharmacy Fax: [phone]  Start Date: 2/3/2021

## 2021-02-03 NOTE — TELEPHONE ENCOUNTER
Prior Authorization Approval    Authorization Effective Date: 1/4/2021  Authorization Expiration Date: 2/3/2022  Medication: fremanezumab-vfrm (AJOVY) SOSY subcutaneous; Inject 1.5 mLs (225 mg) Subcutaneous every 30 days - Subcutaneous  Approved Dose/Quantity:   Reference #: [id number]   Insurance Company: [employer] - Phone [phone] Fax [phone]  Expected CoPay:       CoPay Card Available:      Foundation Assistance Needed:    Which Pharmacy is filling the prescription (Not needed for infusion/clinic administered): [facility] - [place], [place] - [address]  Pharmacy Notified: Yes  Patient Notified: Yes, **Instructed pharmacy to notify patient when script is ready to /ship.**

## 2021-02-04 NOTE — TELEPHONE ENCOUNTER
Central Prior Authorization Team   [phone]    PA Initiation    Medication: Rimegepant Sulfate (NURTEC) 75 MG TBDP; Take 75 mg by mouth daily as needed (migraine. Take at onset of migraine) - Oral  Insurance Company: [employer] - Phone [phone] Fax [phone]  Pharmacy Filling the Rx: [facility] - [place], [place] - [address]  Filling Pharmacy Phone: [phone]  Filling Pharmacy Fax: [phone]  Start Date: 2/4/2021

## 2021-02-04 NOTE — TELEPHONE ENCOUNTER
Prior Authorization Approval        Authorization Effective Date: 1/5/2021  Authorization Expiration Date: 2/4/2022  Medication: Rimegepant Sulfate (NURTEC) 75 MG TBDP; Take 75 mg by mouth daily as needed (migraine. Take at onset of migraine) - Oral-PA APPROVED   Approved Dose/Quantity:   Reference #:     Insurance Company: [employer] - Phone [phone] Fax [phone]  Expected CoPay:       CoPay Card Available:      Foundation Assistance Needed:    Which Pharmacy is filling the prescription (Not needed for infusion/clinic administered): [facility] - [place], [place] - [address] [facility]  Pharmacy Notified: Yes- **Instructed pharmacy to notify patient when script is ready to /ship.**  Patient Notified: Yes

## 2021-02-20 ENCOUNTER — HEALTH MAINTENANCE LETTER (OUTPATIENT)
Age: 51
End: 2021-02-20

## 2021-03-29 ENCOUNTER — MYC MEDICAL ADVICE (OUTPATIENT)
Dept: NEUROLOGY | Facility: CLINIC | Age: 51
End: 2021-03-29

## 2021-03-29 DIAGNOSIS — G43.719 INTRACTABLE CHRONIC MIGRAINE WITHOUT AURA AND WITHOUT STATUS MIGRAINOSUS: Primary | ICD-10-CM

## 2021-04-11 ENCOUNTER — HEALTH MAINTENANCE LETTER (OUTPATIENT)
Age: 51
End: 2021-04-11

## 2021-04-15 ENCOUNTER — TELEPHONE (OUTPATIENT)
Dept: NEUROLOGY | Facility: CLINIC | Age: 51
End: 2021-04-15

## 2021-04-15 NOTE — TELEPHONE ENCOUNTER
Central Prior Authorization Team   [phone]    PA Initiation    Medication: Lasmiditan Succinate 100 MG TABS  Insurance Company: [employer] - Phone [phone] Fax [phone]  Pharmacy Filling the Rx: [facility] - [place] - [address]  Filling Pharmacy Phone: [phone]  Filling Pharmacy Fax: [phone]  Start Date: 4/15/2021

## 2021-04-15 NOTE — TELEPHONE ENCOUNTER
Prior Authorization Retail Medication Request    Medication/Dose: Lasmiditan Succinate 100 MG TABS  ICD code (if different than what is on RX):  G43.719  Previously Tried and Failed:  See Chart  Rationale:  See Chart    Insurance Name:  MEDICARE   Insurance ID: [id number]      Pharmacy Information (if different than what is on RX)  Name:    Phone:

## 2021-04-19 NOTE — TELEPHONE ENCOUNTER
Prior Authorization Approval    Authorization Effective Date: 3/16/2021  Authorization Expiration Date: 4/15/2022  Medication: Lasmiditan Succinate 100 MG TABS-PA APPROVED   Approved Dose/Quantity:   Reference #: CASE # [id number]   Insurance Company: [employer] - Phone [phone] Fax [phone]  Expected CoPay:       CoPay Card Available:      Foundation Assistance Needed:    Which Pharmacy is filling the prescription (Not needed for infusion/clinic administered): [facility] - [place], [place] - [address] [facility]  Pharmacy Notified: Yes- **Instructed pharmacy to notify patient when script is ready to /ship.**  Patient Notified: Yes    **Be Advised: Medication cannot be considered for a Lower copayment (Cost-Sharing Tier Exception) because it is being provided to the patient through an Approved Formulary Exception as noted below.**

## 2021-09-25 ENCOUNTER — HEALTH MAINTENANCE LETTER (OUTPATIENT)
Age: 51
End: 2021-09-25

## 2021-09-28 ENCOUNTER — MYC MEDICAL ADVICE (OUTPATIENT)
Dept: NEUROLOGY | Facility: CLINIC | Age: 51
End: 2021-09-28

## 2021-09-28 DIAGNOSIS — G43.719 INTRACTABLE CHRONIC MIGRAINE WITHOUT AURA AND WITHOUT STATUS MIGRAINOSUS: ICD-10-CM

## 2021-09-28 RX ORDER — FREMANEZUMAB-VFRM 225 MG/1.5ML
225 INJECTION SUBCUTANEOUS
Qty: 1 ML | Refills: 11 | Status: SHIPPED | OUTPATIENT
Start: 2021-09-28 | End: 2022-01-10

## 2021-09-29 ENCOUNTER — TELEPHONE (OUTPATIENT)
Dept: NEUROLOGY | Facility: CLINIC | Age: 51
End: 2021-09-29

## 2021-09-29 NOTE — TELEPHONE ENCOUNTER
Central Prior Authorization Team   Phone: [phone]    Prior Authorization Not Needed per Insurance    Medication: Ajovy 225 mg  Insurance Company: [employer] - Phone [phone] Fax [phone]  Expected CoPay:      Pharmacy Filling the Rx: , [place] - [address] [facility]  Pharmacy Notified: Yes  Patient Notified: No    Spoke to pharmacy, they reprocessed script and was able to get a paid claim. No PA is needed.

## 2021-09-29 NOTE — TELEPHONE ENCOUNTER
Prior Authorization Retail Medication Request  Pharmacy Change    Medication/Dose: Ajovy 225 mg  Chronic migraine  Tried and Failed:  Aimovig 140 mg without benefit. Also has tried and failed verapamil, Botox, Topamax, Keppra, Lamictal, Gabapentin, and Nortriptyline.In past for acute treatment she has tried Imitrex, Maxalt, Relpax, Zomig, Axert without benefit. She is currently using Toradol, Cefaly and ibuprofen for acute management. Toradol works the best.      Rationale:  Chronic migraine. Pt has been using Ajovy for a year with good relief. She feels her migraines are doing better.      Insurance Name:  medicare  Insurance ID:  [id number]   I  Pharmacy Information (if different than what is on RX)  Name:  [facility]  Phone:     Statement Selected

## 2021-10-27 ENCOUNTER — TELEPHONE (OUTPATIENT)
Dept: NEUROLOGY | Facility: CLINIC | Age: 51
End: 2021-10-27

## 2022-01-10 ENCOUNTER — VIRTUAL VISIT (OUTPATIENT)
Dept: NEUROLOGY | Facility: CLINIC | Age: 52
End: 2022-01-10
Payer: MEDICARE

## 2022-01-10 DIAGNOSIS — G43.719 INTRACTABLE CHRONIC MIGRAINE WITHOUT AURA AND WITHOUT STATUS MIGRAINOSUS: Primary | ICD-10-CM

## 2022-01-10 PROCEDURE — 99214 OFFICE O/P EST MOD 30 MIN: CPT | Mod: 95 | Performed by: PSYCHIATRY & NEUROLOGY

## 2022-01-10 RX ORDER — HYDROXYZINE HYDROCHLORIDE 25 MG/1
25 TABLET, FILM COATED ORAL 3 TIMES DAILY PRN
Qty: 30 TABLET | Refills: 11 | Status: SHIPPED | OUTPATIENT
Start: 2022-01-10 | End: 2023-03-28

## 2022-01-10 ASSESSMENT — HEADACHE IMPACT TEST (HIT 6)
HOW OFTEN HAVE YOU FELT TOO TIRED TO WORK BECAUSE OF YOUR HEADACHES: VERY OFTEN
HIT6 TOTAL SCORE: 66
HOW OFTEN DO HEADACHES LIMIT YOUR DAILY ACTIVITIES: VERY OFTEN
WHEN YOU HAVE A HEADACHE HOW OFTEN DO YOU WISH YOU COULD LIE DOWN: SOMETIMES
HOW OFTEN DID HEADACHS LIMIT CONCENTRATION ON WORK OR DAILY ACTIVITY: SOMETIMES
WHEN YOU HAVE HEADACHES HOW OFTEN IS THE PAIN SEVERE: VERY OFTEN
HOW OFTEN HAVE YOU FELT FED UP OR IRRITATED BECAUSE OF YOUR HEADACHES: ALWAYS

## 2022-01-10 NOTE — LETTER
1/10/2022       RE: Yady Aquino  [address]  [facility] [address]     Dear Colleague,    Thank you for referring your patient, Yady Aquino, to the [facility] [place] at [facility]. Please see a copy of my visit note below.         Yady is a 51 year old who is being evaluated via a billable video visit.      How would you like to obtain your AVS? MyChart  If the video visit is dropped, the invitation should be resent by: Send to e-mail at: [email or web address]  Will anyone else be joining your video visit? No    Video Start Time: 2:02 PM  Video-Visit Details    Type of service:  Video Visit    Video End Time: 2:19PM    Originating Location (pt. Location): Home    Distant Location (provider location):  [facility]UROLOGY [facility] [place]     Platform used for Video Visit: [facility]     MIGRAINE DISABILITY ASSESSMENT (MIDAS)    On how many days in the last 3 months did you miss work or school because of your headaches?  0      How many days in the last 3 months was your productivity at work or school reduced by half or more because of your headaches? (Do not include days you counted in question 1 where you missed work or school.)  0    On how many days in the last 3 months did you not do household work (such as housework, home repairs and maintenance, shopping, caring for children and relatives) because of your headaches?  8    How many days in the last 3 months was your productivity in household work reduced by half or more because of your headaches? (Do not include days you counted in question 3 where you did not do household work).  69     On how many days in the last 3 months did you miss family, social, or lesiure activities because of your headaches?  3    MIDAS Total Score: 80    On how many days in the last 3 months did you have a headache? (If a headache lasted more than 1 day, count each day.)   90    On a scale  of 0 - 10, on average how painful were these headaches (where 0 = no pain at all, and 10 = pain as bad as it can be.)  4-9    Last Patient-Answered HIT-6 Questionnaire  HIT-6 1/10/2022   When you have headaches, how often is the pain severe 11   How often do headaches limit your ability to do usual daily activities including household work, work, school, or social activities? 11   When you have a headache, how often do you wish you could lie down? 10   In the past 4 weeks, how often have you felt too tired to do work or daily activities because of your headaches 11   In the past 4 weeks, how often have you felt fed up or irritated because of your headaches 13   In the past 4 weeks, how often did headaches limit your ability to concentrate on work or daily activities 10   HIT-6 Total Score 66           [facility]    Yady Aquino MRN# [id number]   Age: 51 year old YOB: 1970     Requesting physician: Bogdan Gary            Assessment and Plan:     (G43.719) Intractable chronic migraine without aura and without status migrainosus  (primary encounter diagnosis)  Comment: Severe intractable chronic migraines without significant response to medicines.  We will try the final injectable CGRP antagonist to see if this is better than the other 2.  I will then follow-up with her in 4 months time.  If no response consider Vypeti.  Plan:  1.  Preventive: galcanezumab-gnlm (EMGALITY) 120 MG/ML injection instructions given regarding loading dose and injection every 28 days.   2.  Refills provided for the following medications tiZANidine (ZANAFLEX) 4 MG tablet, hydrOXYzine (ATARAX) 25 MG tablet           30 minutes spent caring for the patient on the day of the visit including chart review and video visit    Kourtney Willis MD             History of Present Illness:   CC: Chitra    Yady is a 51 year old woman with chronic migraine.  She experiences daily headaches of some degree that  do flare up.  She also can have accompanying dizziness as well as light sensitivity.  She has tried numerous different treatments prior to this time and failed them.  That list includes the following: Aimovig, Botox, Topamax, Keppra, Lamictal, gabapentin, nortriptyline and most recently Ajovy.    Acute medicines she has tried and failed include Imitrex, Maxalt, Relpax, Zomig and Axert.    Over the past month she is more dizzy  Got her eyes checked awaiting new glasses  Neck and upper back hurt more over the past month.  She is not quite sure why whether it is related to dizziness or headaches.    Taking Ajovy, next day due is January 25. PA runs out in February, 2022           Physical Exam:     On camera the patient is awake and alert.  She is oriented with no aphasia or dysarthria.  Cranial nerves II through XII appear intact      Again, thank you for allowing me to participate in the care of your patient.      Sincerely,    Kourtney Willis MD

## 2022-01-10 NOTE — PROGRESS NOTES
Yady is a 51 year old who is being evaluated via a billable video visit.      How would you like to obtain your AVS? MyChart  If the video visit is dropped, the invitation should be resent by: Send to e-mail at: [email or web address]  Will anyone else be joining your video visit? No    Video Start Time: 2:02 PM  Video-Visit Details    Type of service:  Video Visit    Video End Time: 2:19PM    Originating Location (pt. Location): Home    Distant Location (provider location):  [facility] NEUROLOGY [facility]     Platform used for Video Visit: [facility]     MIGRAINE DISABILITY ASSESSMENT (MIDAS)    On how many days in the last 3 months did you miss work or school because of your headaches?  0      How many days in the last 3 months was your productivity at work or school reduced by half or more because of your headaches? (Do not include days you counted in question 1 where you missed work or school.)  0    On how many days in the last 3 months did you not do household work (such as housework, home repairs and maintenance, shopping, caring for children and relatives) because of your headaches?  8    How many days in the last 3 months was your productivity in household work reduced by half or more because of your headaches? (Do not include days you counted in question 3 where you did not do household work).  69     On how many days in the last 3 months did you miss family, social, or lesiure activities because of your headaches?  3    MIDAS Total Score: 80    On how many days in the last 3 months did you have a headache? (If a headache lasted more than 1 day, count each day.)   90    On a scale of 0 - 10, on average how painful were these headaches (where 0 = no pain at all, and 10 = pain as bad as it can be.)  4-9    Last Patient-Answered HIT-6 Questionnaire  HIT-6 1/10/2022   When you have headaches, how often is the pain severe 11   How often do headaches limit your ability to do usual daily activities  including household work, work, school, or social activities? 11   When you have a headache, how often do you wish you could lie down? 10   In the past 4 weeks, how often have you felt too tired to do work or daily activities because of your headaches 11   In the past 4 weeks, how often have you felt fed up or irritated because of your headaches 13   In the past 4 weeks, how often did headaches limit your ability to concentrate on work or daily activities 10   HIT-6 Total Score 66           [facility]    Yady Aquino MRN# [id number]   Age: 51 year old YOB: 1970     Requesting physician: Bogdan Gary            Assessment and Plan:     (G43.719) Intractable chronic migraine without aura and without status migrainosus  (primary encounter diagnosis)  Comment: Severe intractable chronic migraines without significant response to medicines.  We will try the final injectable CGRP antagonist to see if this is better than the other 2.  I will then follow-up with her in 4 months time.  If no response consider Vypeti.  Plan:  1.  Preventive: galcanezumab-gnlm (EMGALITY) 120 MG/ML injection instructions given regarding loading dose and injection every 28 days.   2.  Refills provided for the following medications tiZANidine (ZANAFLEX) 4 MG tablet, hydrOXYzine (ATARAX) 25 MG tablet           30 minutes spent caring for the patient on the day of the visit including chart review and video visit    Kourtney Willis MD             History of Present Illness:   CC: Recheck    Yady is a 51 year old woman with chronic migraine.  She experiences daily headaches of some degree that do flare up.  She also can have accompanying dizziness as well as light sensitivity.  She has tried numerous different treatments prior to this time and failed them.  That list includes the following: Aimovig, Botox, Topamax, Keppra, Lamictal, gabapentin, nortriptyline and most recently Ajovy.    Acute medicines she  has tried and failed include Imitrex, Maxalt, Relpax, Zomig and Axert.    Over the past month she is more dizzy  Got her eyes checked awaiting new glasses  Neck and upper back hurt more over the past month.  She is not quite sure why whether it is related to dizziness or headaches.    Taking Ajovy, next day due is January 25. PA runs out in February, 2022           Physical Exam:     On camera the patient is awake and alert.  She is oriented with no aphasia or dysarthria.  Cranial nerves II through XII appear intact

## 2022-01-11 ENCOUNTER — TELEPHONE (OUTPATIENT)
Dept: NEUROLOGY | Facility: CLINIC | Age: 52
End: 2022-01-11
Payer: MEDICARE

## 2022-01-11 NOTE — TELEPHONE ENCOUNTER
Prior Authorization Retail Medication Request    Medication/Dose: emgality 240 mg loading dose then 120 mg every 28 days  ICD code (if different than what is on RX):  Chronic migraine  Previously Tried and Failed:   Aimovig, Botox, Topamax, Keppra, Lamictal, gabapentin, nortriptyline and most recently Ajovy.  Acute medicines she has tried and failed include Imitrex, Maxalt, Relpax, Zomig and Axert.     Rationale:   She experiences daily headaches of some degree that do flare up.  She also can have accompanying dizziness as well as light sensitivity.  She has tried numerous different treatments prior to this time and failed them.     Insurance Name:  [facility]  Insurance ID:  [id number]       Pharmacy Information (if different than what is on RX)  Name:    Phone:

## 2022-01-13 NOTE — TELEPHONE ENCOUNTER
Central Prior Authorization Team   Phone: [phone]      PA Initiation    Medication: emgality 240 mg loading dose then 120 mg every 28 days  Insurance Company: [employer] - Phone [phone] Fax [phone]  Pharmacy Filling the Rx: [place], [place] - [address] [facility]  Filling Pharmacy Phone: [phone]  Filling Pharmacy Fax:    Start Date: 1/13/2022

## 2022-01-13 NOTE — TELEPHONE ENCOUNTER
Prior Authorization Approval    Authorization Effective Date: 12/14/2021  Authorization Expiration Date: 1/13/2023  Medication: emgality 240 mg loading dose then 120 mg every 28 days  Approved Dose/Quantity:   Reference #: [id number]   Insurance Company: [employer] - Phone [phone] Fax [phone]  Expected CoPay:        Which Pharmacy is filling the prescription (Not needed for infusion/clinic administered): [facility], [address]  Pharmacy Notified: Yes - spoke to Gunnar  Patient Notified: No

## 2022-03-12 ENCOUNTER — HEALTH MAINTENANCE LETTER (OUTPATIENT)
Age: 52
End: 2022-03-12

## 2022-05-07 ENCOUNTER — HEALTH MAINTENANCE LETTER (OUTPATIENT)
Age: 52
End: 2022-05-07

## 2022-05-16 ENCOUNTER — VIRTUAL VISIT (OUTPATIENT)
Dept: NEUROLOGY | Facility: CLINIC | Age: 52
End: 2022-05-16
Payer: MEDICARE

## 2022-05-16 DIAGNOSIS — G43.719 INTRACTABLE CHRONIC MIGRAINE WITHOUT AURA AND WITHOUT STATUS MIGRAINOSUS: Primary | ICD-10-CM

## 2022-05-16 PROCEDURE — 99213 OFFICE O/P EST LOW 20 MIN: CPT | Mod: 95 | Performed by: PSYCHIATRY & NEUROLOGY

## 2022-05-16 RX ORDER — ATOGEPANT 60 MG/1
60 TABLET ORAL DAILY
Qty: 30 TABLET | Refills: 11 | Status: SHIPPED | OUTPATIENT
Start: 2022-05-16 | End: 2023-06-05

## 2022-05-16 ASSESSMENT — HEADACHE IMPACT TEST (HIT 6)
WHEN YOU HAVE A HEADACHE HOW OFTEN DO YOU WISH YOU COULD LIE DOWN: SOMETIMES
HIT6 TOTAL SCORE: 64
HOW OFTEN DID HEADACHS LIMIT CONCENTRATION ON WORK OR DAILY ACTIVITY: SOMETIMES
WHEN YOU HAVE HEADACHES HOW OFTEN IS THE PAIN SEVERE: SOMETIMES
HOW OFTEN HAVE YOU FELT TOO TIRED TO WORK BECAUSE OF YOUR HEADACHES: SOMETIMES
HOW OFTEN HAVE YOU FELT FED UP OR IRRITATED BECAUSE OF YOUR HEADACHES: ALWAYS
HOW OFTEN DO HEADACHES LIMIT YOUR DAILY ACTIVITIES: VERY OFTEN

## 2022-05-16 ASSESSMENT — MIGRAINE DISABILITY ASSESSMENT (MIDAS)
HOW MANY DAYS IN THE PAST 3 MONTHS HAVE YOU HAD A HEADACHE: 90
TOTAL SCORE: 190
HOW OFTEN WERE SOCIAL ACTIVITIES MISSED DUE TO HEADACHES: 15
ON A SCALE FROM 0-10 ON AVERAGE HOW PAINFUL WERE HEADACHES: 5
HOW MANY DAYS WAS YOUR PRODUCTIVITY CUT IN HALF BECAUSE OF HEADACHES: 60
HOW MANY DAYS DID YOU MISS WORK OR SCHOOL BECAUSE OF HEADACHES: 60
HOW MANY DAYS WAS HOUSEWORK PRODUCTIVITY CUT IN HALF DUE TO HEADACHES: 30
HOW MANY DAYS DID YOU NOT DO HOUSEWORK BECAUSE OF HEADACHES: 25

## 2022-05-16 NOTE — LETTER
5/16/2022       RE: Yady Aquino  [address]  [facility] [address]     Dear Colleague,    Thank you for referring your patient, Yady Aquino, to the [facility] NEUROLOGY [facility]INIC [place] at [facility]. Please see a copy of my visit note below.          [facility]    Yady Aquino MRN# [id number]   Age: 51 year old YOB: 1970     Requesting physician: No ref. provider found  Bogdan Arrington            Assessment and Plan:     (G43.719) Intractable chronic migraine without aura and without status migrainosus  (primary encounter diagnosis)  Comment: The patient continues to not find relief from injectable therapies. We will try Qulipta.  Plan: Atogepant (QULIPTA) 60 MG TABS  Stop Emgality.     She will let me know if she wants to see someone at [facility] for spine surgery consult.     Kourtney Willis MD           History of Present Illness:   CC: Recheck    Yady is a 51 year old woman with difficult to treat intractable migraines. She has continuous pain unrelenting but it will flare.     Started with Emgality in February and no improvement.     Past Preventives Tried and Failed:  Aimovig  Botox  Topamax  Keppra  Lamictal  Gabapentin  Nortriptyline  Ajovy.  Emgality    Acute medicines tried and failed: Imitrex, Maxalt, Relpax, Zomig and Axert  Currently uses tizanidine and hydroxyzine,    Has two herniated discs, no response to conservative therapy. Researching surgeons, will let me know if she wants a [facility] referral.            Physical Exam:     Limited due to video         Pertinent History/Data for appointment:             Sincerely,    Kourtney Willis MD

## 2022-05-16 NOTE — PROGRESS NOTES
Yady is a 51 year old who is being evaluated via a billable video visit.      How would you like to obtain your AVS? MyChart  If the video visit is dropped, the invitation should be resent by: Send to e-mail at: [email or web address]  Will anyone else be joining your video visit? No    Video Start Time: 1:33 PM  Video-Visit Details    Type of service:  Video Visit    Video End Time: 1:50PM    Originating Location (pt. Location): Home    Distant Location (provider location):  [facility] NEUROLOGY [facility]IN[facility] [place]     Platform used for Video Visit: Novapost         [facility]    Yady Aquino MRN# [id number]   Age: 51 year old YOB: 1970     Requesting physician: No ref. provider found  Bogdan Arrington            Assessment and Plan:     (G43.719) Intractable chronic migraine without aura and without status migrainosus  (primary encounter diagnosis)  Comment: The patient continues to not find relief from injectable therapies. We will try Qulipta.  Plan: Atogepant (QULIPTA) 60 MG TABS  Stop Emgality.     She will let me know if she wants to see someone at [facility] for spine surgery consult.     Kourtney Willis MD           History of Present Illness:   CC: Recheck    Yady is a 51 year old woman with difficult to treat intractable migraines. She has continuous pain unrelenting but it will flare.     Started with Emgality in February and no improvement.     Past Preventives Tried and Failed:  Aimovig  Botox  Topamax  Keppra  Lamictal  Gabapentin  Nortriptyline  Ajovy.  Emgality    Acute medicines tried and failed: Imitrex, Maxalt, Relpax, Zomig and Axert  Currently uses tizanidine and hydroxyzine,    Has two herniated discs, no response to conservative therapy. Researching surgeons, will let me know if she wants a [facility] referral.            Physical Exam:     Limited due to video         Pertinent History/Data for appointment:

## 2022-05-20 ENCOUNTER — TELEPHONE (OUTPATIENT)
Dept: NEUROLOGY | Facility: CLINIC | Age: 52
End: 2022-05-20

## 2022-05-20 NOTE — TELEPHONE ENCOUNTER
Prior Authorization Approval        Authorization Effective Date: 4/20/2022  Authorization Expiration Date: 5/20/2023  Medication: Atogepant (QULIPTA) 60 MG TABS  Approved Dose/Quantity: 30 per 30 days  Reference #: [id number]   Insurance Company: [employer] - Phone [phone] Fax [phone]  Expected CoPay: $348.00  Which Pharmacy is filling the prescription (Not needed for infusion/clinic administered): [facility], [address]  Pharmacy Notified: Yes - spoke to Kourtney, patient has a high copay of $348.00 (looks like she may have a $4,000.00 deductible). Kourtney will call patient.  Patient Notified: No

## 2022-05-20 NOTE — TELEPHONE ENCOUNTER
Prior Authorization Retail Medication Request    Medication/Dose:Atogepant (QULIPTA) 60 MG TABS   ICD code (if different than what is on RX):  G43.719  Previously Tried and Failed:  See Chart  Rationale:  See Chart    Insurance Name: [facility] OUT OF STATE   Insurance ID:  [id number]      Pharmacy Information (if different than what is on RX)  Name:  [facility]  Phone:  [phone]

## 2022-05-20 NOTE — TELEPHONE ENCOUNTER
Central Prior Authorization Team   Phone: [phone]        PA Initiation - submitting to [employer], as the [facility] [place] plan is a supplemental plan, supplemental plans do not do P/As.    Medication: Atogepant (QULIPTA) 60 MG TABS  Insurance Company: [employer] - Phone [phone] Fax [phone]  Pharmacy Filling the Rx: [address]  Filling Pharmacy Phone: [phone]  Filling Pharmacy Fax:    Start Date: 5/20/2022

## 2023-01-17 DIAGNOSIS — G43.719 INTRACTABLE CHRONIC MIGRAINE WITHOUT AURA AND WITHOUT STATUS MIGRAINOSUS: ICD-10-CM

## 2023-01-17 NOTE — TELEPHONE ENCOUNTER
[facility] Call Center    Phone Message    May a detailed message be left on voicemail: no     Reason for Call: Medication Refill Request    Has the patient contacted the pharmacy for the refill? Yes   Name of medication being requested: tiZANidine (ZANAFLEX) 4 MG tablet  Provider who prescribed the medication: Kourtney Willis MD  Pharmacy: [address]   Date medication is needed: unknown    Pharmacy calling to request refill. Please send Rx to pharmacy.       Action Taken: Message routed to:  [facility] Neurology    Travel Screening: Not Applicable

## 2023-01-18 NOTE — TELEPHONE ENCOUNTER
Rx Authorization:  Requested Medication/ Dose tiZANidine (ZANAFLEX) 4 MG tablet  Date last refill ordered: 1/10/22  Quantity ordered: 30 tabs  # refills: 11  Date of last clinic visit with ordering provider:   Date of next clinic visit with ordering provider:   All pertinent protocol data (lab date/result):   Include pertinent information from patients message:

## 2023-02-20 ENCOUNTER — TELEPHONE (OUTPATIENT)
Dept: NEUROLOGY | Facility: CLINIC | Age: 53
End: 2023-02-20
Payer: COMMERCIAL

## 2023-02-20 NOTE — TELEPHONE ENCOUNTER
Prior Authorization Retail Medication Request    Medication/Dose: Atogepant (QULIPTA) 60 MG TABS  ICD code (if different than what is on RX):      Previously Tried and Failed:   Aimovig, Botox, Topamax, Keppra, Lamictal, gabapentin, nortriptyline and most recently Ajovy.  Acute medicines she has tried and failed include Imitrex, Maxalt, Relpax, Zomig and Axert.     Rationale:  Pt has been taking Qulipta since May 2022.      Insurance Name: [employer]   Insurance ID: [id number]        Pharmacy Information (if different than what is on RX)  Name:    Phone:

## 2023-02-23 NOTE — TELEPHONE ENCOUNTER
PA Initiation    Medication: Atogepant (QULIPTA) 60 MG TABS  Insurance Company: [employer] - Phone [phone] Fax [phone]  Pharmacy Filling the Rx: [facility] [place] - [address]  Filling Pharmacy Phone: [phone]  Filling Pharmacy Fax: [phone]  Start Date: 2/23/2023

## 2023-02-24 NOTE — TELEPHONE ENCOUNTER
Prior Authorization Approval    Authorization Effective Date: 1/24/2023  Authorization Expiration Date: 2/23/2024  Medication: Atogepant (QULIPTA) 60 MG TABS--APPROVED  Approved Dose/Quantity:   Reference #:     Insurance Company: [employer] - Phone [phone] Fax [phone]  Expected CoPay:       CoPay Card Available:      Foundation Assistance Needed:    Which Pharmacy is filling the prescription (Not needed for infusion/clinic administered): [facility], [place] - [address]  Pharmacy Notified: Yes  Patient Notified: Yes **Instructed pharmacy to notify patient when script is ready to /ship.**

## 2023-03-28 DIAGNOSIS — G43.719 INTRACTABLE CHRONIC MIGRAINE WITHOUT AURA AND WITHOUT STATUS MIGRAINOSUS: ICD-10-CM

## 2023-03-28 RX ORDER — HYDROXYZINE HYDROCHLORIDE 25 MG/1
25 TABLET, FILM COATED ORAL 3 TIMES DAILY PRN
Qty: 30 TABLET | Refills: 11 | Status: SHIPPED | OUTPATIENT
Start: 2023-03-28 | End: 2024-03-22

## 2023-03-28 NOTE — TELEPHONE ENCOUNTER
Rx Authorization:    Requested Medication/ DosehydrOXYzine (ATARAX) 25 MG tablet    Date last refill ordered: 1/10/22    Quantity ordered: 30 tablets    # refills: 11    Date of last clinic visit with ordering provider: 5/16/22    Date of next clinic visit with ordering provider:     All pertinent protocol data (lab date/result):     Include pertinent information from patients message:

## 2023-04-22 ENCOUNTER — HEALTH MAINTENANCE LETTER (OUTPATIENT)
Age: 53
End: 2023-04-22

## 2023-06-02 ENCOUNTER — HEALTH MAINTENANCE LETTER (OUTPATIENT)
Age: 53
End: 2023-06-02

## 2023-06-05 DIAGNOSIS — G43.719 INTRACTABLE CHRONIC MIGRAINE WITHOUT AURA AND WITHOUT STATUS MIGRAINOSUS: ICD-10-CM

## 2023-06-05 RX ORDER — ATOGEPANT 60 MG/1
60 TABLET ORAL DAILY
Qty: 30 TABLET | Refills: 11 | Status: SHIPPED | OUTPATIENT
Start: 2023-06-05 | End: 2024-03-22

## 2023-07-27 ASSESSMENT — HEADACHE IMPACT TEST (HIT 6)
HOW OFTEN DO HEADACHES LIMIT YOUR DAILY ACTIVITIES: VERY OFTEN
HOW OFTEN HAVE YOU FELT FED UP OR IRRITATED BECAUSE OF YOUR HEADACHES: ALWAYS
WHEN YOU HAVE A HEADACHE HOW OFTEN DO YOU WISH YOU COULD LIE DOWN: SOMETIMES
HOW OFTEN HAVE YOU FELT TOO TIRED TO WORK BECAUSE OF YOUR HEADACHES: VERY OFTEN
HOW OFTEN DID HEADACHS LIMIT CONCENTRATION ON WORK OR DAILY ACTIVITY: SOMETIMES
HIT6 TOTAL SCORE: 65
WHEN YOU HAVE HEADACHES HOW OFTEN IS THE PAIN SEVERE: SOMETIMES

## 2023-07-27 ASSESSMENT — MIGRAINE DISABILITY ASSESSMENT (MIDAS)
ON A SCALE FROM 0-10 ON AVERAGE HOW PAINFUL WERE HEADACHES: 5
HOW MANY DAYS IN THE PAST 3 MONTHS HAVE YOU HAD A HEADACHE: 90
HOW OFTEN WERE SOCIAL ACTIVITIES MISSED DUE TO HEADACHES: 10
HOW MANY DAYS WAS HOUSEWORK PRODUCTIVITY CUT IN HALF DUE TO HEADACHES: 40
TOTAL SCORE: 70
HOW MANY DAYS DID YOU MISS WORK OR SCHOOL BECAUSE OF HEADACHES: 0
HOW MANY DAYS DID YOU NOT DO HOUSEWORK BECAUSE OF HEADACHES: 20
HOW MANY DAYS WAS YOUR PRODUCTIVITY CUT IN HALF BECAUSE OF HEADACHES: 0

## 2023-07-31 ENCOUNTER — VIRTUAL VISIT (OUTPATIENT)
Dept: NEUROLOGY | Facility: CLINIC | Age: 53
End: 2023-07-31
Payer: MEDICARE

## 2023-07-31 DIAGNOSIS — M79.7 FIBROMYALGIA: ICD-10-CM

## 2023-07-31 DIAGNOSIS — G43.719 INTRACTABLE CHRONIC MIGRAINE WITHOUT AURA AND WITHOUT STATUS MIGRAINOSUS: Primary | ICD-10-CM

## 2023-07-31 PROBLEM — F32.A DEPRESSIVE DISORDER: Status: RESOLVED | Noted: 2018-06-18 | Resolved: 2023-07-31

## 2023-07-31 PROCEDURE — 99215 OFFICE O/P EST HI 40 MIN: CPT | Mod: VID | Performed by: PSYCHIATRY & NEUROLOGY

## 2023-07-31 RX ORDER — PREGABALIN 150 MG/1
150 CAPSULE ORAL 2 TIMES DAILY
Qty: 60 CAPSULE | Refills: 5 | Status: SHIPPED | OUTPATIENT
Start: 2023-08-28 | End: 2024-03-22

## 2023-07-31 RX ORDER — PREGABALIN 50 MG/1
CAPSULE ORAL
Qty: 84 CAPSULE | Refills: 0 | Status: SHIPPED | OUTPATIENT
Start: 2023-07-31 | End: 2024-03-22

## 2023-07-31 NOTE — PROGRESS NOTES
[facility]    Yady Aquino MRN# [id number]   Age: 52 year old YOB: 1970     Requesting physician: Referred Self  Bogdan Arrington            Assessment and Plan:     (G43.144) Intractable chronic migraine without aura and without status migrainosus  (primary encounter diagnosis)  Comment: Still present but manageable. We will add pregabalin to Qulipta and tizanidine. If it helps I would recommend tapering baclofen to see if she can be more awake. Discussed consideration for a chronic pain rehab program such as the one run at [place].   Plan:   Qulipta 60 mg QHS  Pregabalin, goal dose 150 mg BID  Tizanidine 2 mg QHS  Consider repeat trial of Botox. Would need to be done closer to home at [place].   Next appointment needs to be in person.    (M79.7) Fibromyalgia  Comment: Not managed by neurology but I did mention that pregabalin may help fibromyalgia too.     40 minutes spent caring for the patient today including video time and documentation time.     Kourtney Willis MD           History of Present Illness:   CC: Chitra Conteh is a 52-year-old woman with chronic intractable migraines that have yet to respond to any medicine with any meaningful change. She also has concurrent fibromyalgia. She has had a constant daily headache since 2014. She reports today that she is tired of being in pain. We reviewed her MIDAS results and her score is slightly higher than last visit but better than January 2022.       For her migraines she is currently taking:   On Qulipta 60 mg daily (beginning of the year $800/month, $390/month)  Tizanidine 4 mg at bedtime. (Sleeping 6-7 hours/night)  Using Cefaly daily.   Using hydroxyzine on an as needed basis, using at leats once a week, will also take at night.     She reports she also uses baclofen daily for muscle tightness and does note she isn't sure if that helps either. She also notes some sleepiness from medicines.     Past Preventives Tried and  Failed:  Aimovig  Botox  Topamax blurred vision and upset stomach  Keppra  Verapamil constipation  Lamictal rash  Gabapentin intolerable side effects  Nortriptyline 25 mg at night  dry mouth   Ajovy. Ineffectiveness  Emgality Ineffective.      Acute medicines tried and failed: Imitrex, Maxalt, Relpax, Zomig and Axert              Physical Exam:     On video she is awake and alert, no acute distress  She has no aphasia or dysarthria.   Face is symmetric and moves with normal strength.          Pertinent History/Data for appointment:     3/21/23 [facility] labs  CMP normal except glucose 69  TSH 0.78    6/2/23  Ferritin 22  Iron 116  TIBC 318  Iron saturation 36%

## 2023-07-31 NOTE — PROGRESS NOTES
Virtual Visit Details    Type of service:  Video Visit   Video Start Time: 1:31 PM  Video End Time: 1:56PM    Originating Location (pt. Location): Other [place]    Distant Location (provider location):  Off-site  Platform used for Video Visit: John

## 2023-07-31 NOTE — LETTER
7/31/2023       RE: Yady Aquino  [address] [facility] [address]         Dear Colleague,    Thank you for referring your patient, Yady Aquino, to the [facility] NEUROLOGY CLINIC [place] at [facility]. Please see a copy of my visit note below.         Physicians    Yady Aquino MRN# [id number]   Age: 52 year old YOB: 1970     Requesting physician: Referred Self  Bogdan Arrington            Assessment and Plan:     (G43.479) Intractable chronic migraine without aura and without status migrainosus  (primary encounter diagnosis)  Comment: Still present but manageable. We will add pregabalin to Qulipta and tizanidine. If it helps I would recommend tapering baclofen to see if she can be more awake. Discussed consideration for a chronic pain rehab program such as the one run at [place].   Plan:   Qulipta 60 mg QHS  Pregabalin, goal dose 150 mg BID  Tizanidine 2 mg QHS  Consider repeat trial of Botox. Would need to be done closer to home at [place].   Next appointment needs to be in person.    (M79.7) Fibromyalgia  Comment: Not managed by neurology but I did mention that pregabalin may help fibromyalgia too.     40 minutes spent caring for the patient today including video time and documentation time.     Kourtney Willis MD           History of Present Illness:   CC: Chitra Conteh is a 52-year-old woman with chronic intractable migraines that have yet to respond to any medicine with any meaningful change. She also has concurrent fibromyalgia. She has had a constant daily headache since 2014. She reports today that she is tired of being in pain. We reviewed her MIDAS results and her score is slightly higher than last visit but better than January 2022.       For her migraines she is currently taking:   On Qulipta 60 mg daily (beginning of the year $800/month, $390/month)  Tizanidine 4 mg at bedtime. (Sleeping 6-7 hours/night)  Using  Cefaly daily.   Using hydroxyzine on an as needed basis, using at leats once a week, will also take at night.     She reports she also uses baclofen daily for muscle tightness and does note she isn't sure if that helps either. She also notes some sleepiness from medicines.     Past Preventives Tried and Failed:  Aimovig  Botox  Topamax blurred vision and upset stomach  Keppra  Verapamil constipation  Lamictal rash  Gabapentin intolerable side effects  Nortriptyline 25 mg at night  dry mouth   Ajovy. Ineffectiveness  Emgality Ineffective.      Acute medicines tried and failed: Imitrex, Maxalt, Relpax, Zomig and Axert              Physical Exam:     On video she is awake and alert, no acute distress  She has no aphasia or dysarthria.   Face is symmetric and moves with normal strength.          Pertinent History/Data for appointment:     3/21/23 [place] labs  CMP normal except glucose 69  TSH 0.78    6/2/23  Ferritin 22  Iron 116  TIBC 318  Iron saturation 36%      Again, thank you for allowing me to participate in the care of your patient.      Sincerely,    Kourtney Willis MD

## 2023-08-17 ENCOUNTER — TELEPHONE (OUTPATIENT)
Dept: NEUROLOGY | Facility: CLINIC | Age: 53
End: 2023-08-17
Payer: COMMERCIAL

## 2023-08-17 NOTE — TELEPHONE ENCOUNTER
Patient Contacted to schedule the following:    Appointment type: Return Headache in person  Provider: Alba  Return date: March of 2024  Specialty phone number: [phone]  Additional appointment(s) needed: N/A  Additonal Notes: N/A    Spoke with patient, scheduled on 3/22/2024 at 1:15pm.     Matteo Carmona on 8/17/2023 at 4:20 PM

## 2024-03-14 ENCOUNTER — TELEPHONE (OUTPATIENT)
Dept: NEUROLOGY | Facility: CLINIC | Age: 54
End: 2024-03-14

## 2024-03-14 NOTE — TELEPHONE ENCOUNTER
Prior Authorization Retail Medication Request     Medication/Dose: Atogepant (QULIPTA) 60 MG TABS  ICD code (if different than what is on RX):       Previously Tried and Failed:   Aimovig, Botox, Topamax, Keppra, Lamictal, gabapentin, nortriptyline and most recently Ajovy.  Acute medicines she has tried and failed include Imitrex, Maxalt, Relpax, Zomig and Axert.     Rationale:  Pt has been taking Qulipta since May 2022.       Insurance Name: Medicare   Insurance ID:[id number]    SECONDARY Insurance  [facility] out of state  Insurance ID:  [id number]         Pharmacy Information (if different than what is on RX)  Name:    Phone:

## 2024-03-18 ASSESSMENT — MIGRAINE DISABILITY ASSESSMENT (MIDAS)
HOW MANY DAYS IN THE PAST 3 MONTHS HAVE YOU HAD A HEADACHE: 90
ON A SCALE FROM 0-10 ON AVERAGE HOW PAINFUL WERE HEADACHES: 5
HOW MANY DAYS WAS HOUSEWORK PRODUCTIVITY CUT IN HALF DUE TO HEADACHES: 45
HOW MANY DAYS DID YOU NOT DO HOUSEWORK BECAUSE OF HEADACHES: 15
HOW MANY DAYS WAS YOUR PRODUCTIVITY CUT IN HALF BECAUSE OF HEADACHES: 0
HOW OFTEN WERE SOCIAL ACTIVITIES MISSED DUE TO HEADACHES: 12
HOW MANY DAYS DID YOU MISS WORK OR SCHOOL BECAUSE OF HEADACHES: 0
TOTAL SCORE: 72

## 2024-03-18 ASSESSMENT — HEADACHE IMPACT TEST (HIT 6)
WHEN YOU HAVE A HEADACHE HOW OFTEN DO YOU WISH YOU COULD LIE DOWN: SOMETIMES
HOW OFTEN DO HEADACHES LIMIT YOUR DAILY ACTIVITIES: VERY OFTEN
WHEN YOU HAVE HEADACHES HOW OFTEN IS THE PAIN SEVERE: SOMETIMES
HOW OFTEN HAVE YOU FELT FED UP OR IRRITATED BECAUSE OF YOUR HEADACHES: ALWAYS
HIT6 TOTAL SCORE: 64
HOW OFTEN HAVE YOU FELT TOO TIRED TO WORK BECAUSE OF YOUR HEADACHES: SOMETIMES
HOW OFTEN DID HEADACHS LIMIT CONCENTRATION ON WORK OR DAILY ACTIVITY: SOMETIMES

## 2024-03-22 ENCOUNTER — OFFICE VISIT (OUTPATIENT)
Dept: NEUROLOGY | Facility: CLINIC | Age: 54
End: 2024-03-22
Payer: MEDICARE

## 2024-03-22 VITALS
OXYGEN SATURATION: 99 % | SYSTOLIC BLOOD PRESSURE: 151 MMHG | RESPIRATION RATE: 16 BRPM | HEART RATE: 98 BPM | DIASTOLIC BLOOD PRESSURE: 92 MMHG

## 2024-03-22 DIAGNOSIS — G43.719 INTRACTABLE CHRONIC MIGRAINE WITHOUT AURA AND WITHOUT STATUS MIGRAINOSUS: ICD-10-CM

## 2024-03-22 DIAGNOSIS — M26.609 TEMPOROMANDIBULAR JOINT DISORDER: Primary | ICD-10-CM

## 2024-03-22 PROCEDURE — 99215 OFFICE O/P EST HI 40 MIN: CPT | Performed by: PSYCHIATRY & NEUROLOGY

## 2024-03-22 RX ORDER — TERBINAFINE HYDROCHLORIDE 250 MG/1
250 TABLET ORAL DAILY
COMMUNITY
Start: 2024-03-12 | End: 2024-06-10

## 2024-03-22 RX ORDER — ATOGEPANT 60 MG/1
60 TABLET ORAL DAILY
Qty: 90 TABLET | Refills: 3 | Status: SHIPPED | OUTPATIENT
Start: 2024-03-22

## 2024-03-22 RX ORDER — HYDROXYZINE HYDROCHLORIDE 25 MG/1
25 TABLET, FILM COATED ORAL 3 TIMES DAILY PRN
Qty: 30 TABLET | Refills: 11 | Status: SHIPPED | OUTPATIENT
Start: 2024-03-22

## 2024-03-22 RX ORDER — MEMANTINE HYDROCHLORIDE 10 MG/1
TABLET ORAL
Qty: 180 TABLET | Refills: 3 | Status: SHIPPED | OUTPATIENT
Start: 2024-03-22 | End: 2024-07-09

## 2024-03-22 NOTE — PROGRESS NOTES
Neurology Progress Note    [facility]    Yady Aquino MRN# [id number]   Age: 53 year old YOB: 1970     PCP: Bogdan Arrington            Assessment and Plan:     (G43.418) Intractable chronic migraine without aura and without status migrainosus  Comment: The patient continues to have intractable migraines. Qulipta has helped but not enough in its own most effective treatment she has tried so far. We will add Namanda as an off label use to see if it is helpful. Continue Tizanidine.   Plan:   Preventive: Continue Qulipta and add Namenda. Use Cefaly daily  Acute: Tizanidine prn, zofran prn, acetaminophen, ibuprofen and toradol prn     TMD  Dr Joseph contacted to request the PT protocols his clinic uses for TMD and self care handouts. She lives too far to come back to see him.     40 minutes spent caring for the patient on the day of the visit including precharting and visit time.     Kourtney Willis MD           History of Present Illness:   CC: Recheck    Yady is a 53 year old with chronic intractable migraine. Her last visit was 7/31/2023.   She reports last 1.5 months has been really severe due to stress.     Frequency: Still has daily headaches with flares in severity 1-2 times a week.   Duration: constant  Quality: pressure flares to throbbing  Location: forehead, jaw, neck and back of the head    For her migraines she is currently taking:   On Qulipta 60 mg daily   Using Cefaly daily.   Tizanidine 4 mg she has had trouble filling it last month  Using hydroxyzine on an as needed basis, using 1-2 x weeks         5/16/2022     1:10 PM 7/27/2023     8:58 AM 3/18/2024    11:22 AM   HIT-6   When you have headaches, how often is the pain severe 10 10 10   How often do headaches limit your ability to do usual daily activities including household work, work, school, or social activities? 11 11 11   When you have a headache, how often do you wish you could lie down? 10 10 10   In the past 4 weeks, how  often have you felt too tired to do work or daily activities because of your headaches 10 11 10   In the past 4 weeks, how often have you felt fed up or irritated because of your headaches 13 13 13   In the past 4 weeks, how often did headaches limit your ability to concentrate on work or daily activities 10 10 10   HIT-6 Total Score 64 65 64           5/16/2022     1:13 PM 7/27/2023     9:06 AM 3/18/2024    11:36 AM   MIDAS - in the past three months:   On how many days did you miss work or school because of your headaches? 60 0 0   How many days was your productivity at work or school reduced by half or more because of your headaches? 60 0 0   On how many days did you not do household work because of your headaches? 25 20 15   How many days was your productivity in household work reduced by half or more because of your headaches? 30 40 45   On how many days did you miss family, social, or leisure activities because of your headaches? 15 10 12   On how many days did you have a headache? 90 90 90   On a scale of 0-10, on average how painful were these headaches? 5 5 5   MIDAS Score 190 (IV - Severe Disability) 70 (IV - Severe Disability) 72 (IV - Severe Disability)         Past Preventives Tried and Failed:  Aimovig  Botox  Topamax blurred vision and upset stomach  Keppra  Verapamil constipation  Lamictal rash  Gabapentin intolerable side effects  Nortriptyline 25 mg at night  dry mouth   Ajovy. Ineffectiveness  Emgality Ineffective.   Pregabalin couldn't tolerate it de to dizziness so she stopped after one month, also could not see benefit  She has asthma and has a rescue inhaler    Acute medicines tried and failed: Imitrex, Maxalt, Relpax, Zomig and Axert         Physical Exam:     BP (!) 151/92 (BP Location: Left arm, Patient Position: Sitting, Cuff Size: Adult Regular)   Pulse 98   Resp 16   SpO2 99%   Recheck 137/83  Awake, alert, NAD  Tenderness over masseters and temporalis with palpation. She reports this  is reproducible pain of her headaches and TMD.  CN 2-12 intact  No tremor  Posture is normal  Gait is stable         Pertinent History/Data for appointment:     Recently she had an abnormal mammogram and biopsy. Results were benign.

## 2024-03-22 NOTE — LETTER
3/22/2024       RE: Yady Aquino  [address]  [place] [facility] [place] [address]     Dear Colleague,    Thank you for referring your patient, Yady Aquino, to the [facility] NEUROLOGY CLINIC [place] at [facility]. Please see a copy of my visit note below.    Neurology Progress Note    [facility]    Yady Aquino MRN# [id number]   Age: 53 year old YOB: 1970     PCP: Bogdan Arrington            Assessment and Plan:     (G43.719) Intractable chronic migraine without aura and without status migrainosus  Comment: The patient continues to have intractable migraines. Qulipta has helped but not enough in its own most effective treatment she has tried so far. We will add Namanda as an off label use to see if it is helpful. Continue Tizanidine.   Plan:   Preventive: Continue Qulipta and add Namenda. Use Cefaly daily  Acute: Tizanidine prn, zofran prn, acetaminophen, ibuprofen and toradol prn     TMD  Dr Joseph contacted to request the PT protocols his clinic uses for TMD and self care handouts. She lives too far to come back to see him.     40 minutes spent caring for the patient on the day of the visit including precharting and visit time.     Kourtney Willis MD           History of Present Illness:   CC: Chitra Conteh is a 53 year old with chronic intractable migraine. Her last visit was 7/31/2023.   She reports last 1.5 months has been really severe due to stress.     Frequency: Still has daily headaches with flares in severity 1-2 times a week.   Duration: constant  Quality: pressure flares to throbbing  Location: forehead, jaw, neck and back of the head    For her migraines she is currently taking:   On Qulipta 60 mg daily   Using Cefaly daily.   Tizanidine 4 mg she has had trouble filling it last month  Using hydroxyzine on an as needed basis, using 1-2 x weeks         5/16/2022     1:10 PM 7/27/2023     8:58 AM 3/18/2024    11:22 AM   HIT-6    When you have headaches, how often is the pain severe 10 10 10   How often do headaches limit your ability to do usual daily activities including household work, work, school, or social activities? 11 11 11   When you have a headache, how often do you wish you could lie down? 10 10 10   In the past 4 weeks, how often have you felt too tired to do work or daily activities because of your headaches 10 11 10   In the past 4 weeks, how often have you felt fed up or irritated because of your headaches 13 13 13   In the past 4 weeks, how often did headaches limit your ability to concentrate on work or daily activities 10 10 10   HIT-6 Total Score 64 65 64           5/16/2022     1:13 PM 7/27/2023     9:06 AM 3/18/2024    11:36 AM   MIDAS - in the past three months:   On how many days did you miss work or school because of your headaches? 60 0 0   How many days was your productivity at work or school reduced by half or more because of your headaches? 60 0 0   On how many days did you not do household work because of your headaches? 25 20 15   How many days was your productivity in household work reduced by half or more because of your headaches? 30 40 45   On how many days did you miss family, social, or leisure activities because of your headaches? 15 10 12   On how many days did you have a headache? 90 90 90   On a scale of 0-10, on average how painful were these headaches? 5 5 5   MIDAS Score 190 (IV - Severe Disability) 70 (IV - Severe Disability) 72 (IV - Severe Disability)         Past Preventives Tried and Failed:  Aimovig  Botox  Topamax blurred vision and upset stomach  Keppra  Verapamil constipation  Lamictal rash  Gabapentin intolerable side effects  Nortriptyline 25 mg at night  dry mouth   Ajovy. Ineffectiveness  Emgality Ineffective.   Pregabalin couldn't tolerate it de to dizziness so she stopped after one month, also could not see benefit  She has asthma and has a rescue inhaler    Acute medicines tried  and failed: Imitrex, Maxalt, Relpax, Zomig and Axert         Physical Exam:     BP (!) 151/92 (BP Location: Left arm, Patient Position: Sitting, Cuff Size: Adult Regular)   Pulse 98   Resp 16   SpO2 99%   Recheck 137/83  Awake, alert, NAD  Tenderness over masseters and temporalis with palpation. She reports this is reproducible pain of her headaches and TMD.  CN 2-12 intact  No tremor  Posture is normal  Gait is stable         Pertinent History/Data for appointment:     Recently she had an abnormal mammogram and biopsy. Results were benign.         Again, thank you for allowing me to participate in the care of your patient.      Sincerely,    Kourtney Willis MD

## 2024-03-22 NOTE — NURSING NOTE
Chief Complaint   Patient presents with    RECHECK     Here for a follow up, confirmed with patient     Dixie Cash

## 2024-03-22 NOTE — PATIENT INSTRUCTIONS
Relivion:  [email or web address].[email or web address]-[email or web address]    Concentrate on not clenching jaw or having teeth touch     Dr Willis will request some exercises [facility] and email them to you to review your PT.    Add memantine  5 mg once a day x 1 week  5 mg twice a day x 1 week  10 mg in AM, 5 mg in PM x 1 week  10 mg twice a day    Contact Dr Willis with update in 8 weeks. If no help we will try propranolol    Ask you PCP if you have tried cyclobenzaprine. Could consider switching tizanidine

## 2024-03-24 PROBLEM — M26.609 TEMPOROMANDIBULAR JOINT DISORDER: Status: ACTIVE | Noted: 2024-03-24

## 2024-03-25 NOTE — TELEPHONE ENCOUNTER
Prior Authorization Approval    Medication: QULIPTA 60 MG PO TABS  Authorization Effective Date: 1/1/2024  Authorization Expiration Date: 12/31/2024  Approved Dose/Quantity:   Reference #:     Insurance Company: [employer] [phone] Fax [phone]  Expected CoPay: $    CoPay Card Available:      Financial Assistance Needed:   Which Pharmacy is filling the prescription: [facility] ph: [phone] fax: [phone]  Pharmacy Notified: Yes  Patient Notified: **Instructed pharmacy to notify patient when script is ready to /ship.**

## 2024-03-25 NOTE — TELEPHONE ENCOUNTER
Note: Due to record-high volumes, our turn-around time is taking longer than usual . We are currently 10 business days behind in the pools.   We are working diligently to submit all requests in a timely manner and in the order they are received. Please only flag TRUE URGENT requests as high priority to the pool at this time.   If you have questions - please send a note/message in the active PA encounter and send back to the RPPA (Retail Pharmacy Prior Authorization) team [[id number]].    If you have more specific questions about our process please reach out to our supervisor Leyda Fong.   Thank you!     Central Prior Authorization Team  Phone: [phone]    PA Initiation    Medication: QULIPTA 60 MG PO TABS  Insurance Company: [employer] - Phone [phone] Fax [phone]  Pharmacy Filling the Rx: [id number] - [place], [place] - [address] [place] [address] #2  Filling Pharmacy Phone: [phone]  Filling Pharmacy Fax:    Start Date: 3/25/2024

## 2024-04-11 ENCOUNTER — MYC MEDICAL ADVICE (OUTPATIENT)
Dept: NEUROLOGY | Facility: CLINIC | Age: 54
End: 2024-04-11
Payer: MEDICARE

## 2024-06-29 ENCOUNTER — HEALTH MAINTENANCE LETTER (OUTPATIENT)
Age: 54
End: 2024-06-29

## 2024-07-17 ENCOUNTER — MYC MEDICAL ADVICE (OUTPATIENT)
Dept: NEUROLOGY | Facility: CLINIC | Age: 54
End: 2024-07-17
Payer: COMMERCIAL

## 2024-07-17 DIAGNOSIS — G43.719 INTRACTABLE CHRONIC MIGRAINE WITHOUT AURA AND WITHOUT STATUS MIGRAINOSUS: Primary | ICD-10-CM

## 2024-07-17 RX ORDER — CANDESARTAN 8 MG/1
8 TABLET ORAL DAILY
Qty: 30 TABLET | Refills: 11 | Status: SHIPPED | OUTPATIENT
Start: 2024-07-17

## 2025-01-09 DIAGNOSIS — G43.719 INTRACTABLE CHRONIC MIGRAINE WITHOUT AURA AND WITHOUT STATUS MIGRAINOSUS: ICD-10-CM

## 2025-01-09 NOTE — TELEPHONE ENCOUNTER
RX Authorization    Medication: tiZANidine (ZANAFLEX) 4 MG tablet    Date last refill ordered: 3/22/2024    Quantity ordered: 90    # refills:  3    Date of last clinic visit with ordering provider: 3/22/2024    Date of next clinic visit with ordering provider:    All pertinent protocol data (lab date/result):    Include pertinent information from patients message:

## 2025-03-19 DIAGNOSIS — G43.719 INTRACTABLE CHRONIC MIGRAINE WITHOUT AURA AND WITHOUT STATUS MIGRAINOSUS: ICD-10-CM

## 2025-03-19 ASSESSMENT — MIGRAINE DISABILITY ASSESSMENT (MIDAS)
HOW MANY DAYS IN THE PAST 3 MONTHS HAVE YOU HAD A HEADACHE: 90
HOW MANY DAYS DID YOU NOT DO HOUSEWORK BECAUSE OF HEADACHES: 40
HOW OFTEN WERE SOCIAL ACTIVITIES MISSED DUE TO HEADACHES: 15
ON A SCALE FROM 0-10 ON AVERAGE HOW PAINFUL WERE HEADACHES: 6
HOW MANY DAYS DID YOU MISS WORK OR SCHOOL BECAUSE OF HEADACHES: 0
HOW MANY DAYS WAS HOUSEWORK PRODUCTIVITY CUT IN HALF DUE TO HEADACHES: 35
HOW MANY DAYS WAS YOUR PRODUCTIVITY CUT IN HALF BECAUSE OF HEADACHES: 0
TOTAL SCORE: 90

## 2025-03-19 ASSESSMENT — HEADACHE IMPACT TEST (HIT 6)
HOW OFTEN DO HEADACHES LIMIT YOUR DAILY ACTIVITIES: VERY OFTEN
HIT6 TOTAL SCORE: 65
HOW OFTEN DID HEADACHS LIMIT CONCENTRATION ON WORK OR DAILY ACTIVITY: VERY OFTEN
WHEN YOU HAVE HEADACHES HOW OFTEN IS THE PAIN SEVERE: SOMETIMES
HOW OFTEN HAVE YOU FELT FED UP OR IRRITATED BECAUSE OF YOUR HEADACHES: VERY OFTEN
WHEN YOU HAVE A HEADACHE HOW OFTEN DO YOU WISH YOU COULD LIE DOWN: VERY OFTEN
HOW OFTEN HAVE YOU FELT TOO TIRED TO WORK BECAUSE OF YOUR HEADACHES: VERY OFTEN

## 2025-03-19 NOTE — TELEPHONE ENCOUNTER
Health Call Center    Phone Message    May a detailed message be left on voicemail: yes     Reason for Call: Medication Refill Request    Has the patient contacted the pharmacy for the refill? Yes   Name of medication being requested: Atogepant (QULIPTA) 60 MG TABS  Provider who prescribed the medication: Kourtney Willis MD  Pharmacy: [id number] - [place], [place] - [address] [place] [address] #2  Date medication is needed: 03/21/2025     Robyn from [facility] calling in. States that patient contacted their pharmacy requesting for a refill, as they will be out of medication by Friday. Please review and call pharmacy with any questions or concerns at [phone]    Action Taken: Message routed to:  [facility] Neurology

## 2025-03-24 ENCOUNTER — VIRTUAL VISIT (OUTPATIENT)
Dept: NEUROLOGY | Facility: CLINIC | Age: 55
End: 2025-03-24
Payer: MEDICARE

## 2025-03-24 VITALS — WEIGHT: 130 LBS | BODY MASS INDEX: 20.4 KG/M2 | HEIGHT: 67 IN

## 2025-03-24 DIAGNOSIS — G43.719 INTRACTABLE CHRONIC MIGRAINE WITHOUT AURA AND WITHOUT STATUS MIGRAINOSUS: Primary | ICD-10-CM

## 2025-03-24 PROCEDURE — 1125F AMNT PAIN NOTED PAIN PRSNT: CPT | Mod: 95 | Performed by: PSYCHIATRY & NEUROLOGY

## 2025-03-24 PROCEDURE — 98007 SYNCH AUDIO-VIDEO EST HI 40: CPT | Performed by: PSYCHIATRY & NEUROLOGY

## 2025-03-24 RX ORDER — HYDROXYZINE HYDROCHLORIDE 25 MG/1
25 TABLET, FILM COATED ORAL 3 TIMES DAILY PRN
Qty: 30 TABLET | Refills: 11 | Status: SHIPPED | OUTPATIENT
Start: 2025-03-24

## 2025-03-24 ASSESSMENT — PAIN SCALES - GENERAL: PAINLEVEL_OUTOF10: MODERATE PAIN (6)

## 2025-03-24 ASSESSMENT — PATIENT HEALTH QUESTIONNAIRE - PHQ9: SUM OF ALL RESPONSES TO PHQ QUESTIONS 1-9: 10

## 2025-03-24 NOTE — PROGRESS NOTES
Virtual Visit Details    Type of service:  Video Visit   Video Start Time:  1:01PM  Video End Time: 1:27PM      Distant Location (provider location):  On-site  Platform used for Video Visit: John

## 2025-03-24 NOTE — LETTER
3/24/2025       RE: Yady Aquino  [address]  [facility] [address]     Dear Colleague,    Thank you for referring your patient, Yady Aquino, to the [facility] NEUROLOGY [facility] [place] [facility]. Please see a copy of my visit note below.    Neurology Progress Note    [facility]    Yady Aquino MRN# [id number]   Age: 54 year old YOB: 1970     PCP: Claudette Fragoso            Assessment and Plan:     (G43.719) Intractable chronic migraine without aura and without status migrainosus  (primary encounter diagnosis)  Comment: Patient continues to have an intractable migraine that has responded to no treatments.  We will pursue whether she could combine Botox with a different treatment such as Vyepti.  [facility] pharmacy consult has been placed for Vyepti initiation.  She is hoping to receive that infusion at home rather than have to travel.  She will investigate where she could go for Botox.  She would prefer to go to [place].  She understands that wherever she receives the Botox would be responsible for the prior authorization and it would be important that they ensure prior authorization with insurance understands that both Botox and CGRP antagonists are being used.    Refills for hydroxyzine were provided today.  The patient will stay on Qulipta until Vyepti has been approved and this will then be discontinued.    40 minutes spent caring for the patient on the day of the visit that included chart review, video visit time and order placement.    Follow-up in 1 year.    Kourtney Willis MD           History of Present Illness:   CC: Chitra    Yady is a 54 year old woman with intractable chronic migraine that has been intractable and not responding to multiple different past treatments. Last visit was March 2024. At that time we added Namenda as an off label medication in addition to her Qulipta and Cefaly use daily and prn use of  tizanidine, zofran, Toradol, Ibuprofen and Tylenol use.  This did not make a difference to her symptoms.  In July via my chart we added candesartan 8 mg daily.  This again has not acted her symptoms.  It does sound as though she sometimes feels faint taking this medicine as well.     She reports her headaches have been just as bad as before. She has had several stressful months and is awaiting news of her 's echo but the numbers look similar to her typical baseline.    She lives in [place] and is unable to travel here for Botox injections.  At last appointment we reviewed TMD management and I sent her some handouts on how to do self cares.    Past preventives:  Aimovig  Botox monthly  Topamax  Keppra  Verapamil  Lamictal  Gabapentin  Nortriptyline  Ajovy  Emgality  Pregabalin  Tizanidine  Qulipta  Candesartan             Physical Exam:     On video the patient is awake and alert.  Normal facial movement and symmetry.  No aphasia or dysarthria.         Pertinent History/Data for appointment:         7/27/2023     8:58 AM 3/18/2024    11:22 AM 3/19/2025    10:47 AM   HIT-6   When you have headaches, how often is the pain severe 10 10 10   How often do headaches limit your ability to do usual daily activities including household work, work, school, or social activities? 11 11 11   When you have a headache, how often do you wish you could lie down? 10 10 11   In the past 4 weeks, how often have you felt too tired to do work or daily activities because of your headaches 11 10 11   In the past 4 weeks, how often have you felt fed up or irritated because of your headaches 13 13 11   In the past 4 weeks, how often did headaches limit your ability to concentrate on work or daily activities 10 10 11   HIT-6 Total Score 65 64 65        Patient-reported           7/27/2023     9:06 AM 3/18/2024    11:36 AM 3/19/2025    10:59 AM   MIDAS - in the past three months:   On how many days did you miss work or school  because of your headaches? 0 0 0   How many days was your productivity at work or school reduced by half or more because of your headaches? 0 0 0   On how many days did you not do household work because of your headaches? 20 15 40   How many days was your productivity in household work reduced by half or more because of your headaches? 40 45 35   On how many days did you miss family, social, or leisure activities because of your headaches? 10 12 15   On how many days did you have a headache? 90 90 90   On a scale of 0-10, on average how painful were these headaches? 5 5 6   MIDAS Score 70 (IV - Severe Disability) 72 (IV - Severe Disability) 90 (IV - Severe Disability)       Virtual Visit Details    Type of service:  Video Visit   Video Start Time:  1:01PM  Video End Time: 1:27PM      Distant Location (provider location):  On-site  Platform used for Video Visit: John      Again, thank you for allowing me to participate in the care of your patient.      Sincerely,    Kourtney Willis MD

## 2025-03-24 NOTE — NURSING NOTE
[place] in the state of [place]? YES    Visit mode: VIDEO    If the visit is dropped, the patient can be reconnected by:VIDEO VISIT: Text to cell phone:   Telephone Information:   Mobile [phone]       Will anyone else be joining the visit? NO  (If patient encounters technical issues they should call [phone] :[phone])    Are changes needed to the allergy or medication list? Pt stated no changes to allergies and Pt stated no med changes    Are refills needed on medications prescribed by this physician? Discuss with provider    Rooming Documentation:  Questionnaire(s) completed      Reason for visit: RECHECK    Wt other than 24 hrs:    Pain more than one location:  6 head, neck   Melany Denton VVF       HIGH PHQ2  Depression Response    Patient completed the PHQ-9 assessment for depression and scored >9? Yes  Question 9 on the PHQ-9 was positive for suicidality? No  Does patient have current mental health provider? No    Is this a virtual visit? Yes   Does patient have suicidal ideation (positive question 9)? No - offer to place Mental Health Referral.  Patient declined referral/not needed    I personally notified the following: visit provider

## 2025-03-24 NOTE — PROGRESS NOTES
Neurology Progress Note    [facility]    Yady Aquino MRN# [id number]   Age: 54 year old YOB: 1970     PCP: Claudette Fragoso            Assessment and Plan:     (G48.427) Intractable chronic migraine without aura and without status migrainosus  (primary encounter diagnosis)  Comment: Patient continues to have an intractable migraine that has responded to no treatments.  We will pursue whether she could combine Botox with a different treatment such as Vyepti.  [facility] pharmacy consult has been placed for Vyepti initiation.  She is hoping to receive that infusion at home rather than have to travel.  She will investigate where she could go for Botox.  She would prefer to go to [place].  She understands that wherever she receives the Botox would be responsible for the prior authorization and it would be important that they ensure prior authorization with insurance understands that both Botox and CGRP antagonists are being used.    Refills for hydroxyzine were provided today.  The patient will stay on Qulipta until Vyepti has been approved and this will then be discontinued.    40 minutes spent caring for the patient on the day of the visit that included chart review, video visit time and order placement.    Follow-up in 1 year.    Kourtney Willis MD           History of Present Illness:   CC: Chitra Conteh is a 54 year old woman with intractable chronic migraine that has been intractable and not responding to multiple different past treatments. Last visit was March 2024. At that time we added Namenda as an off label medication in addition to her Qulipta and Cefaly use daily and prn use of tizanidine, zofran, Toradol, Ibuprofen and Tylenol use.  This did not make a difference to her symptoms.  In July via my chart we added candesartan 8 mg daily.  This again has not acted her symptoms.  It does sound as though she sometimes feels faint taking this medicine as well.     She reports her headaches have been  just as bad as before. She has had several stressful months and is awaiting news of her 's echo but the numbers look similar to her typical baseline.    She lives in [place] and is unable to travel here for Botox injections.  At last appointment we reviewed TMD management and I sent her some handouts on how to do self cares.    Past preventives:  Aimovig  Botox monthly  Topamax  Keppra  Verapamil  Lamictal  Gabapentin  Nortriptyline  Ajovy  Emgality  Pregabalin  Tizanidine  Qulipta  Candesartan             Physical Exam:     On video the patient is awake and alert.  Normal facial movement and symmetry.  No aphasia or dysarthria.         Pertinent History/Data for appointment:         7/27/2023     8:58 AM 3/18/2024    11:22 AM 3/19/2025    10:47 AM   HIT-6   When you have headaches, how often is the pain severe 10 10 10   How often do headaches limit your ability to do usual daily activities including household work, work, school, or social activities? 11 11 11   When you have a headache, how often do you wish you could lie down? 10 10 11   In the past 4 weeks, how often have you felt too tired to do work or daily activities because of your headaches 11 10 11   In the past 4 weeks, how often have you felt fed up or irritated because of your headaches 13 13 11   In the past 4 weeks, how often did headaches limit your ability to concentrate on work or daily activities 10 10 11   HIT-6 Total Score 65 64 65        Patient-reported           7/27/2023     9:06 AM 3/18/2024    11:36 AM 3/19/2025    10:59 AM   MIDAS - in the past three months:   On how many days did you miss work or school because of your headaches? 0 0 0   How many days was your productivity at work or school reduced by half or more because of your headaches? 0 0 0   On how many days did you not do household work because of your headaches? 20 15 40   How many days was your productivity in household work reduced by half or more because of your  headaches? 40 45 35   On how many days did you miss family, social, or leisure activities because of your headaches? 10 12 15   On how many days did you have a headache? 90 90 90   On a scale of 0-10, on average how painful were these headaches? 5 5 6   MIDAS Score 70 (IV - Severe Disability) 72 (IV - Severe Disability) 90 (IV - Severe Disability)

## 2025-03-25 ENCOUNTER — TELEPHONE (OUTPATIENT)
Dept: NEUROLOGY | Facility: CLINIC | Age: 55
End: 2025-03-25
Payer: MEDICARE

## 2025-03-25 ENCOUNTER — CARE COORDINATION (OUTPATIENT)
Dept: NEUROLOGY | Facility: CLINIC | Age: 55
End: 2025-03-25
Payer: MEDICARE

## 2025-03-25 NOTE — TELEPHONE ENCOUNTER
Patient confirmed scheduled appointment:  Date: 03/30/2026  Time: 1:30PM  Visit type: RETURN HEADACHE  Provider: KAIN  Location: [facility]  Testing/imaging: N/A  Additional notes: 1 year follow-up

## 2025-03-25 NOTE — PATIENT INSTRUCTIONS
Dr Willis will reach out after MTM consult.     Please let her know where to send information about Botox approval.

## 2025-03-26 ENCOUNTER — CARE COORDINATION (OUTPATIENT)
Dept: NEUROLOGY | Facility: CLINIC | Age: 55
End: 2025-03-26
Payer: MEDICARE

## 2025-04-02 ENCOUNTER — TELEPHONE (OUTPATIENT)
Dept: PHARMACY | Facility: CLINIC | Age: 55
End: 2025-04-02
Payer: MEDICARE

## 2025-04-02 ENCOUNTER — VIRTUAL VISIT (OUTPATIENT)
Dept: PHARMACY | Facility: CLINIC | Age: 55
End: 2025-04-02
Attending: PSYCHIATRY & NEUROLOGY
Payer: MEDICARE

## 2025-04-02 DIAGNOSIS — M26.609 TEMPOROMANDIBULAR JOINT DISORDER: ICD-10-CM

## 2025-04-02 DIAGNOSIS — J30.9 ALLERGIC RHINITIS: ICD-10-CM

## 2025-04-02 DIAGNOSIS — Z78.9 TAKES DIETARY SUPPLEMENTS: ICD-10-CM

## 2025-04-02 DIAGNOSIS — F32.A DEPRESSION: ICD-10-CM

## 2025-04-02 DIAGNOSIS — G43.719 INTRACTABLE CHRONIC MIGRAINE WITHOUT AURA AND WITHOUT STATUS MIGRAINOSUS: Primary | ICD-10-CM

## 2025-04-02 RX ORDER — LACTOBACILLUS RHAMNOSUS GG 10B CELL
1 CAPSULE ORAL DAILY
COMMUNITY

## 2025-04-02 RX ORDER — KETOROLAC TROMETHAMINE 30 MG/ML
60 INJECTION, SOLUTION INTRAMUSCULAR; INTRAVENOUS DAILY PRN
COMMUNITY
Start: 2025-03-24

## 2025-04-02 NOTE — PATIENT INSTRUCTIONS
"Recommendations from today's MTM visit:                                                    MTM (medication therapy management) is a service provided by a clinical pharmacist designed to help you get the most of out of your medicines.      Plan to start Vyepti 100mg infusions every 90 days   Possible side effects: infusion reaction (usually in the first 24 hours), scratchy throat, and runny nose   Once the orders for Vyepti are signed, we will get them faxed off to [facility] in [place]   Continue with Qulipta until able to start Vyepti     Follow-up:   Appointments in Next Year      Jun 04, 2025 11:00 AM  Pharmacist Visit with An Julian RPH  [facility] Neurology [facility] ([facility] ) [phone]     Mar 30, 2026 1:30 PM  (Arrive by 1:15 PM)  Return Headache with Kourtney Willis MD  [facility] Neurology [facility] ([facility] ) [phone]            It was great speaking with you today.  I value your experience and would be very thankful for your time in providing feedback in our clinic survey. In the next few days, you may receive an email or text message from [employer] with a link to a survey related to your  clinical pharmacist.\"     To schedule another MTM appointment, please call the clinic directly or you may call the MTM scheduling line at [phone] or toll-free at [phone].     My Clinical Pharmacist's contact information:                                                      Please feel free to contact me with any questions or concerns you have.      An Julian, PharmD, BCACP  Medication Therapy Management Pharmacist   [facility] Neurology     "

## 2025-04-02 NOTE — PROGRESS NOTES
Medication Therapy Management (MTM) Encounter    ASSESSMENT:                            Medication Adherence/Access: No issues identified.    Headache   Migraine:   Migraines are not currently controlled with current medication regimen. Patient may benefit from switching from Qulipta to Vyepti infusions. Reviewed medication mechanism, dosing, administration, time to expected benefit, possible side effects, and infusion process. Patient prefers to infuse through [facility] in [place], [place]. Once infusion orders are signed, will need to be faxed to this infusion center. Continue with current as needed acute medication regimen, but could consider adding Ubrely or Zavzpret in the future. Will follow up with patient in 8 weeks.     Asthma /allergy  Stable, continue with current medication regimen.     Mental Health   Depression and Insomnia  Stable, continue with current medication regimen.     Supplements   Stable, continue with current supplement regimen.     Muscle spasms/ Jaw tightness   Reviewed increased risk of sedation when taking two muscle relaxants together. Patient has been tolerating both mediations at this time. It may be worth reducing tapering off of baclofen and increase frequency of tizanidine to see if patient notices any change.     PLAN:                            Plan to start Vyepti 100mg infusions every 90 days   Possible side effects: infusion reaction (usually in the first 24 hours), scratchy throat, and runny nose   Once the orders for Vyepti are signed, we will get them faxed off to [facility] in [place]   Continue with Qulipta until able to start Vyepti     Follow-up:   Appointments in Next Year      Jun 04, 2025 11:00 AM  Pharmacist Visit with An Julian RPH  [facility] Neurology MTM ([facility] ) [phone]     Mar 30, 2026 1:30 PM  (Arrive by 1:15 PM)  Return Headache with Kourtney Willis MD  [facility]  [place] ([facility] ) [phone]            SUBJECTIVE/OBJECTIVE:                          Yady Aquino is a 54 year old female seen for an initial visit. She was referred to me from Dr. Willis.      Reason for visit: Vyepti education and coordination.    Allergies/ADRs: Reviewed in chart  Past Medical History: Reviewed in chart  Tobacco: She reports that she has never smoked. She has never used smokeless tobacco.  Alcohol: not currently using    Medication Adherence/Access: no issues reported.    Headache   Migraine:   Preventive medications  -Qulipta 60 daily - just has not been helpful at reducing her migraines  Has been taking this medication for almost three years   -Cefaly device     May restart Botox if insurance covers this. It was slightly effective in the past but was not on any other medications at this time. She is hoping that she can get restarted on this and combine with Vyepti     Acute medications  -Acetaminophen 500 mg daily as needed - rare use, will reach for ibuprofen more often  -hydroxyzine 25mg three times daily as needed - Recently has been almost every day but when her migraines are better controlled it's three times weekly   -ibuprofen  200mg daily as needed - tries to only take once a week  -Ketorolac 60mg IM as needed - recently its been weekly she has needed but usually only twice monthly   -Ondansetron 8mg as needed - needing about once weekly   - Meclizine 25mg as needed - only really needs if going on a long car ride    Does not believe she has tried Ubrelvy   Associated symptoms include: Nausea, Photophobia, and Phonophobia  Patient reports having 30/30 headache days per month and having 2-3 severe migraines per week  Current non-pharmacologic treatments include: Cefaly device, ice and heat packs, massage, hot tub, meditation, yoga    Past medications tried/failed:   Aimovig  Botox  monthly  Topamax  Keppra  Verapamil  Lamictal  Gabapentin  Nortriptyline  Ajovy  Emgality  Pregabalin  Tizanidine  Qulipta  Candesartan  Reyvow   Promethazine  Zomig   Maxalt   Migranal   Atenolol   Memantine   Nurtec - unsure if this was every other day or as needed  DHE       Asthma /allergy  -Albuterol inhaler 108mcg two puffs every 6 hours as needed - rare use  -Montelukast 10mg daily   -Triamcinolone 55mcg two sprays in each nostril  Patient reports no current medication side effects.      Patient reports the following symptoms: none.     Mental Health   Depression and Insomnia  -Bupropion 300mg once daily.   -Diphenhydramine 25mg nightly as needed - rare use, will only take if needs help falling asleep  Patient reports no current medication side effects.  Patient reports symptoms are stable.     Supplements   -Probiotic once daily   -Multivitamin daily   -Calcium-vitamin D 600mg -800units daily   -Magnesium 300mg daily   No reported issues at this time.         Muscle spasms/ Jaw tightness   -Baclofen 15mg three times daily   -Tizanidine 4mg nightly - helps her fall asleep so unsure if helps with her migraines but helps release her jaw tension.   Patient reports no concerns or side effects with medications (not experiencing any sedation)      Today's Vitals: There were no vitals taken for this visit.  ----------------      I spent 31 minutes with this patient today. I offer these suggestions for consideration by Dr. Willis.     A summary of these recommendations was sent via [employer].    An Julian, PharmD, BCACP  Medication Therapy Management Pharmacist   [facility] Neurology      Telemedicine Visit Details  The patient's medications can be safely assessed via a telemedicine encounter.  Type of service:  Telephone visit  Originating Location (pt. Location): Home  Distant Location (provider location):  Off-site  Start Time: 10:31 AM  End Time: 11:02 AM     Medication Therapy Recommendations  No  medication therapy recommendations to display

## 2025-04-02 NOTE — TELEPHONE ENCOUNTER
Patient to start Vyepti infusion with plans of infusing at [facility] in [place], [place]. Pending therapy plan to Dr. Willis to review and sign. The infusion order will then need to be faxed ([phone])    An Julian, PharmD, BCACP  Medication Therapy Management Pharmacist   [facility] Neurology

## 2025-04-09 ENCOUNTER — DOCUMENTATION ONLY (OUTPATIENT)
Dept: NEUROLOGY | Facility: CLINIC | Age: 55
End: 2025-04-09
Payer: MEDICARE

## 2025-04-09 NOTE — PROGRESS NOTES
signed Vyepti orders have been faxed to [facility] in [place]. The fax is [phone].

## 2025-04-10 ENCOUNTER — DOCUMENTATION ONLY (OUTPATIENT)
Dept: PHARMACY | Facility: CLINIC | Age: 55
End: 2025-04-10
Payer: MEDICARE

## 2025-04-10 NOTE — PROGRESS NOTES
Vyepti orders have been faxed to [facility] in [place] ([phone]).    An Julian, PharmD, BCACP  Medication Therapy Management Pharmacist   [facility]

## 2025-05-07 ENCOUNTER — TELEPHONE (OUTPATIENT)
Dept: NEUROLOGY | Facility: CLINIC | Age: 55
End: 2025-05-07

## 2025-05-07 NOTE — TELEPHONE ENCOUNTER
"[facility] Vye[facility]i infusion reaction. Scanned into chart, but waiting for it to be uploaded by HIM.     Note from infusion RN to MD (Dr. Claudette Fragoso)  \"Pt was in [facility] infusion center for her first Vyepti infusion. [place] through infusion pt developed mild flushing and tingling to the jaw. Once infusion stopped, symptoms improved pt was OK with restarting infusion. Infusion was almost complete when pt's symptoms reappeared along with vision changes and a rash to the infusion arm. Pt given 25mg of Benadryl IV and 125mg of Solumedrol IV. Pt rested for 30 minutes and symptoms resolved. Pt stated she felt that she was back to her baseline, vitals were appropriate, and pt stated she felt OK to leave. Our infusion NP also felt safe to release her at this time. I am messaging you to let you know how the infusion went and see if you wanted to discontinue the infusion or add premedications for the next infusion. Her next infusion is scheduled for 7/31/25\".     Dr. Claudette Fragoso forwarded this conversation to us for Dr. Willis's review and how provider would like to proceed.   "

## 2025-05-21 NOTE — TELEPHONE ENCOUNTER
Received another fax relaying same message.     There was no fax or contact number to call back. Reached out to [facility]. LVM w/ RN Care team relaying Dr. Willis's recommendations. We will reach out closer to when patient is due for next infusion on next steps. Clinic # provided.

## 2025-06-04 ENCOUNTER — VIRTUAL VISIT (OUTPATIENT)
Dept: PHARMACY | Facility: CLINIC | Age: 55
End: 2025-06-04
Attending: PSYCHIATRY & NEUROLOGY
Payer: MEDICARE

## 2025-06-04 DIAGNOSIS — G43.719 INTRACTABLE CHRONIC MIGRAINE WITHOUT AURA AND WITHOUT STATUS MIGRAINOSUS: Primary | ICD-10-CM

## 2025-06-04 NOTE — PROGRESS NOTES
Medication Therapy Management (MTM) Encounter    ASSESSMENT:                            Medication Adherence/Access: No issues identified.    Headache   Migraine:    Patient received first Vyepti infusion on 5/1/25 and experienced an infusion reaction that was relieved after IV diphenhydramine and methylprednisolone were administered. She was then able to complete the entire infusion. She may have 1 less migraine per week since starting Vypeti but still has daily headaches. Patient is open to trying one more infusion if able to receive premedications. Will discuss with Dr. Willis and then follow-up with primary care provider's office who ordered the infusion therapy plan to be given at [facility]. Next infusion for Vyepti is scheduled for 7/31/2025.    PLAN:                            I will follow-up with Dr. Willis to discuss continuing Vyepti  You next infusion is scheduled for 7/31/2025 at [facility] in [place]     Follow-up: 3 months     SUBJECTIVE/OBJECTIVE:                          Yady Aquino is a 54 year old female seen for a follow-up visit.       Reason for visit: Vyepti follow-up.    Allergies/ADRs: Reviewed in chart  Past Medical History: Reviewed in chart  Tobacco: She reports that she has never smoked. She has never used smokeless tobacco.  Alcohol: not currently using    Medication Adherence/Access: no issues reported.    Headache   Migraine:   Preventive medications  -Vyepti 100mg infusions every 90 days - first infusion was May 1st 2025  Patient did experience a rash, flushing, and tingling of jaw - she was given Benadryl and solumedrol and the infusion was continued. The benadryl and solumedrol helped relive the infusion reaction.   She ended receiving the infusion at [facility] in [place].   Patient reports her primary care provider signed a therapy plan order for patient to be able to infuse at the [facility] because  they don't accept outside provider orders     Patient reports that there may a slight improvement since switching from Qulipta to Vypeti   Patient is interested in maybe trying one more infusion     Acute medications  -Acetaminophen 500 mg daily as needed - rare use, will reach for ibuprofen more often  -hydroxyzine 25mg three times daily as needed - Patient reports taking 3-4 times per week at night   -ibuprofen  200mg daily as needed - tries to only take once a week  -Ketorolac 60mg IM as needed - recently its been weekly she has needed but usually only twice monthly   -Ondansetron 8mg as needed - needing about once weekly   - Meclizine 25mg as needed - only really needs if going on a long car ride    Does not believe she has tried Ubrelvy     Associated symptoms include: Nausea, Photophobia, and Phonophobia    Patient reports having 30/30 headache days per month and having 2 severe migraines per week    She reports she may have 1 less migraine day per week     Current non-pharmacologic treatments include: Cefaly device, ice and heat packs, massage, hot tub, meditation, yoga    Past medications tried/failed:   Aimovig  Botox monthly  Topamax  Keppra  Verapamil  Lamictal  Gabapentin  Nortriptyline  Ajovy  Emgality  Pregabalin  Tizanidine  Qulipta  Candesartan  Reyvow   Promethazine  Zomig   Maxalt   Migranal   Atenolol   Memantine   Nurtec - unsure if this was every other day or as needed  DHE           Today's Vitals: There were no vitals taken for this visit.  ----------------      I spent 14 minutes with this patient today. I offer these suggestions for consideration by Dr. Willis.     A summary of these recommendations was sent via [employer].    An Julian, PharmD, BCACP  Medication Therapy Management Pharmacist   [facility]      Telemedicine Visit Details  The patient's medications can be safely assessed via a telemedicine encounter.  Type of service:  Telephone visit  Originating Location  (pt. Location): [facility]   Distant Location (provider location):  Off-site  Start Time: 10:30 AM  End Time: 10:44 AM     Medication Therapy Recommendations  No medication therapy recommendations to display

## 2025-06-04 NOTE — PATIENT INSTRUCTIONS
"Recommendations from today's MTM visit:                                                       I will follow-up with Dr. Willis to discuss continuing Vyepti  You next infusion is scheduled for 7/31/2025 at [facility] in [place]     Follow-up: 3 months     It was great speaking with you today.  I value your experience and would be very thankful for your time in providing feedback in our clinic survey. In the next few days, you may receive an email or text message from [employer] with a link to a survey related to your  clinical pharmacist.\"     To schedule another MTM appointment, please call the clinic directly or you may call the MTM scheduling line at [phone] or toll-free at [phone].     My Clinical Pharmacist's contact information:                                                      Please feel free to contact me with any questions or concerns you have.      An Julian, PharmD, BCACP  Medication Therapy Management Pharmacist   [facility]     "

## 2025-06-04 NOTE — Clinical Note
Hi Dr. Alba Spicer I had a MTM f/up with pt today. She reports maybe experiencing 1 less migraine per week since starting Vyepti (down from 2-3 per week to about 2 per week). She is open to trying one more infusion if pre-meds can be given since she had an infusion reaction at her first infusion. Do you want to continue with vyepti and do you want to continue with 100mg dose? She infuses at [facility] which doesn't take outside provider orders so her primary care provider ordered her Vypeti. Let me know how you would like to proceed and I can update her primary care provider.   Thanks!

## 2025-06-09 ENCOUNTER — TELEPHONE (OUTPATIENT)
Dept: PHARMACY | Facility: CLINIC | Age: 55
End: 2025-06-09
Payer: MEDICARE

## 2025-06-09 NOTE — TELEPHONE ENCOUNTER
Patient is receiving Vypeti infusions through [facility] in [place]. This infusion center does not accept orders from outside providers so patient's primary care provider has signed the orders for Vyepti so patient can receive the infusion at [facility]. Patient was seen for MTM visit on 6/4 with the plan to continue with Vyepti 100mg every 90 days and add premedications of methylprednisolone and diphenhydramine to therapy plan for next infusion in July to avoid infusion reaction.    Called patient's primary care provider's office (Dr. Fragoso) to discuss adding premedications to therapy plan. Left message with my direct line requesting either the provider or nursing staff return my call to discuss.     An Julian, PharmD, BCACP  Medication Therapy Management Pharmacist   [facility]

## 2025-06-09 NOTE — TELEPHONE ENCOUNTER
Received message from ERIC Mo from [facility] with Dr. Fragoso's office. Chely stated that Dr. Fragoso is out of the office this week and will return on 6/16. She stated she would send the message onto the provider to review and follow-up once she returns to the office.     An Julian, PharmD, [facility]  Medication Therapy Management Pharmacist   [facility]

## 2025-07-13 ENCOUNTER — HEALTH MAINTENANCE LETTER (OUTPATIENT)
Age: 55
End: 2025-07-13

## 2025-08-01 ENCOUNTER — TELEPHONE (OUTPATIENT)
Dept: NEUROLOGY | Facility: CLINIC | Age: 55
End: 2025-08-01
Payer: MEDICARE